# Patient Record
Sex: FEMALE | Race: WHITE | NOT HISPANIC OR LATINO | Employment: OTHER | URBAN - METROPOLITAN AREA
[De-identification: names, ages, dates, MRNs, and addresses within clinical notes are randomized per-mention and may not be internally consistent; named-entity substitution may affect disease eponyms.]

---

## 2017-01-25 ENCOUNTER — HOSPITAL ENCOUNTER (EMERGENCY)
Facility: CLINIC | Age: 82
Discharge: HOME | End: 2017-01-25
Attending: EMERGENCY MEDICINE | Admitting: EMERGENCY MEDICINE
Payer: MEDICARE

## 2017-01-25 ENCOUNTER — APPOINTMENT (OUTPATIENT)
Dept: CT IMAGING | Facility: CLINIC | Age: 82
End: 2017-01-25
Attending: EMERGENCY MEDICINE
Payer: MEDICARE

## 2017-01-25 VITALS
RESPIRATION RATE: 18 BRPM | BODY MASS INDEX: 23.18 KG/M2 | TEMPERATURE: 97.6 F | DIASTOLIC BLOOD PRESSURE: 77 MMHG | OXYGEN SATURATION: 97 % | SYSTOLIC BLOOD PRESSURE: 129 MMHG | HEART RATE: 87 BPM | WEIGHT: 135.8 LBS | HEIGHT: 64 IN

## 2017-01-25 DIAGNOSIS — R51.9 NONINTRACTABLE HEADACHE, UNSPECIFIED CHRONICITY PATTERN, UNSPECIFIED HEADACHE TYPE: ICD-10-CM

## 2017-01-25 LAB
ANION GAP SERPL CALCULATED.3IONS-SCNC: 7 MMOL/L (ref 3–14)
BASOPHILS # BLD AUTO: 0.1 10E9/L (ref 0–0.2)
BASOPHILS NFR BLD AUTO: 1.2 %
BUN SERPL-MCNC: 18 MG/DL (ref 7–30)
CALCIUM SERPL-MCNC: 9.2 MG/DL (ref 8.5–10.1)
CHLORIDE SERPL-SCNC: 109 MMOL/L (ref 94–109)
CO2 SERPL-SCNC: 27 MMOL/L (ref 20–32)
CREAT SERPL-MCNC: 0.86 MG/DL (ref 0.52–1.04)
DIFFERENTIAL METHOD BLD: NORMAL
EOSINOPHIL # BLD AUTO: 0.4 10E9/L (ref 0–0.7)
EOSINOPHIL NFR BLD AUTO: 7.4 %
ERYTHROCYTE [DISTWIDTH] IN BLOOD BY AUTOMATED COUNT: 13.5 % (ref 10–15)
ERYTHROCYTE [SEDIMENTATION RATE] IN BLOOD BY WESTERGREN METHOD: 8 MM/H (ref 0–30)
GFR SERPL CREATININE-BSD FRML MDRD: 63 ML/MIN/1.7M2
GLUCOSE SERPL-MCNC: 90 MG/DL (ref 70–99)
HCT VFR BLD AUTO: 35.6 % (ref 35–47)
HGB BLD-MCNC: 11.9 G/DL (ref 11.7–15.7)
IMM GRANULOCYTES # BLD: 0 10E9/L (ref 0–0.4)
IMM GRANULOCYTES NFR BLD: 0.3 %
INTERPRETATION ECG - MUSE: NORMAL
LYMPHOCYTES # BLD AUTO: 2.3 10E9/L (ref 0.8–5.3)
LYMPHOCYTES NFR BLD AUTO: 38.3 %
MCH RBC QN AUTO: 31.1 PG (ref 26.5–33)
MCHC RBC AUTO-ENTMCNC: 33.4 G/DL (ref 31.5–36.5)
MCV RBC AUTO: 93 FL (ref 78–100)
MONOCYTES # BLD AUTO: 0.5 10E9/L (ref 0–1.3)
MONOCYTES NFR BLD AUTO: 9.1 %
NEUTROPHILS # BLD AUTO: 2.6 10E9/L (ref 1.6–8.3)
NEUTROPHILS NFR BLD AUTO: 43.7 %
NRBC # BLD AUTO: 0 10*3/UL
NRBC BLD AUTO-RTO: 0 /100
PLATELET # BLD AUTO: 187 10E9/L (ref 150–450)
POTASSIUM SERPL-SCNC: 4.1 MMOL/L (ref 3.4–5.3)
RBC # BLD AUTO: 3.83 10E12/L (ref 3.8–5.2)
SODIUM SERPL-SCNC: 143 MMOL/L (ref 133–144)
WBC # BLD AUTO: 5.9 10E9/L (ref 4–11)

## 2017-01-25 PROCEDURE — 85025 COMPLETE CBC W/AUTO DIFF WBC: CPT | Performed by: EMERGENCY MEDICINE

## 2017-01-25 PROCEDURE — 80048 BASIC METABOLIC PNL TOTAL CA: CPT | Performed by: EMERGENCY MEDICINE

## 2017-01-25 PROCEDURE — A9270 NON-COVERED ITEM OR SERVICE: HCPCS | Mod: GY | Performed by: EMERGENCY MEDICINE

## 2017-01-25 PROCEDURE — 85652 RBC SED RATE AUTOMATED: CPT | Performed by: EMERGENCY MEDICINE

## 2017-01-25 PROCEDURE — 99285 EMERGENCY DEPT VISIT HI MDM: CPT | Mod: 25

## 2017-01-25 PROCEDURE — 25000132 ZZH RX MED GY IP 250 OP 250 PS 637: Mod: GY | Performed by: EMERGENCY MEDICINE

## 2017-01-25 PROCEDURE — 93005 ELECTROCARDIOGRAM TRACING: CPT

## 2017-01-25 PROCEDURE — 70450 CT HEAD/BRAIN W/O DYE: CPT

## 2017-01-25 RX ORDER — BUTALBITAL, ASPIRIN AND CAFFEINE 50; 325; 40 MG/1; MG/1; MG/1
2 TABLET ORAL ONCE
Status: DISCONTINUED | OUTPATIENT
Start: 2017-01-25 | End: 2017-01-25

## 2017-01-25 RX ORDER — BUTALBITAL/ASPIRIN/CAFFEINE 50-325-40
2 CAPSULE ORAL ONCE
Status: COMPLETED | OUTPATIENT
Start: 2017-01-25 | End: 2017-01-25

## 2017-01-25 RX ORDER — BUTALBITAL/ASPIRIN/CAFFEINE 50-325-40
1 CAPSULE ORAL EVERY 4 HOURS PRN
Qty: 20 CAPSULE | Refills: 0 | Status: SHIPPED | OUTPATIENT
Start: 2017-01-25 | End: 2020-12-11

## 2017-01-25 RX ADMIN — BUTALBITAL, ASPIRIN, AND CAFFEINE 2 CAPSULE: 50; 325; 40 CAPSULE ORAL at 19:36

## 2017-01-25 ASSESSMENT — ENCOUNTER SYMPTOMS
DIZZINESS: 1
NAUSEA: 0
WEAKNESS: 0
CONSTIPATION: 1
SPEECH DIFFICULTY: 0
FEVER: 0
VOMITING: 0
HEADACHES: 1

## 2017-01-25 NOTE — ED AVS SNAPSHOT
Emergency Department    [address] [place] [address]    Phone:  [phone]    Fax:  [phone]                                       Linda Kelly   MRN: [id number]    Department:   Emergency Department   Date of Visit:  1/25/2017           After Visit Summary Signature Page     I have received my discharge instructions, and my questions have been answered. I have discussed any challenges I see with this plan with the nurse or doctor.    ..........................................................................................................................................  Patient/Patient Representative Signature      ..........................................................................................................................................  Patient Representative Print Name and Relationship to Patient    ..................................................               ................................................  Date                                            Time    ..........................................................................................................................................  Reviewed by Signature/Title    ...................................................              ..............................................  Date                                                            Time

## 2017-01-25 NOTE — ED AVS SNAPSHOT
Emergency Department    [address] [facility] [address]    Phone:  [phone]    Fax:  [phone]                                       Linda Kelly   MRN: [id number]    Department:   Emergency Department   Date of Visit:  1/25/2017           Patient Information     Date Of Birth          10/8/1933        Your diagnoses for this visit were:     Nonintractable headache, unspecified chronicity pattern, unspecified headache type        You were seen by Raphael Mitchell MD.      Follow-up Information     Schedule an appointment as soon as possible for a visit with Edward Kaur MD.    Specialty:  Family Practice    Why:  recheck ed, If symptoms worsen    Contact information:    [facility] [address]   [address]  [facility] [id number] [phone]          Discharge Instructions          * HEADACHE [unspecified]    The cause of your headache today is not clear, but it does not appear to be the sign of any serious illness.  Under stress, some people tense the muscles of their shoulder, neck and scalp without knowing it. If this condition lasts long enough, a TENSION HEADACHE can occur.  A MIGRAINE HEADACHE is caused by changes in blood flow to the brain. It can be mild or severe. A migraine attack may be triggered by emotional stress, hormone changes during the menstrual cycle, oral contraceptives, alcohol use, certain foods containing tyramine, eye strain, weather changes, missing meals, lack of sleep or oversleeping.  Other causes of headache include a viral illness, sinus, ear or throat infection, dental pain and TMJ (jaw joint) pain.  HOME CARE:    If you were given pain medicine for this headache, do not drive yourself home. Arrange for a ride, instead. When you get home, try to sleep. You should feel much better when you wake up.    If you are having nausea or vomiting, follow a light diet until your headache is relieved.    If you have a migraine type headache,  use sunglasses when in the daylight or around bright indoor lighting until symptoms improve. Bright glaring light can worsen this kind of headache.  FOLLOW UP with your doctor if the headache is not better within the next 24 hours. If you have frequent headaches you should discuss a treatment plan with your primary care doctor. By being aware of the earliest signs of headache, and starting treatment right away, you may be able to stop the pain yourself.  GET PROMPT MEDICAL ATTENTION if any of the following occur:    Worsening of your head pain or no improvement within 24 hours    Repeated vomiting (unable to keep liquids down)    Fever over 101 F (38.3 C)    Stiff neck    Extreme drowsiness, confusion or fainting    Weakness of an arm or leg or one side of the face    Difficulty with speech or vision    [id number] [facility] [place], [address] [place] [address], [place] [address]. All rights reserved. This information is not intended as a substitute for professional medical care. Always follow your healthcare professional's instructions.      24 Hour Appointment Hotline       To make an appointment at any [facility], call [phone] ([phone]). If you don't have a family doctor or clinic, we will help you find one. [facility] clinics are conveniently located to serve the needs of you and your family.             Review of your medicines      START taking        Dose / Directions Last dose taken    butalbital-aspirin-caffeine -40 MG per capsule   Commonly known as:  FIORINAL   Dose:  1 capsule   Quantity:  20 capsule        Take 1 capsule by mouth every 4 hours as needed for headaches   Refills:  0          Our records show that you are taking the medicines listed below. If these are incorrect, please call your family doctor or clinic.        Dose / Directions Last dose taken    acetaminophen 325 MG tablet   Commonly known as:  TYLENOL   Dose:  975 mg   Quantity:  60 tablet        Take 3 tablets (975 mg) by  mouth every 8 hours   Refills:  0        AMOXICILLIN PO   Dose:  2000 mg        Take 2,000 mg by mouth daily as needed (Before dental appts.)   Refills:  0        calcium-vitamin D-vitamin K 500-500-40 MG-UNT-MCG Chew   Commonly known as:  VIACTIV   Dose:  1-2 tablet        Take 1-2 tablets by mouth daily   Refills:  0        DETROL PO   Dose:  2 mg        Take 2 mg by mouth daily as needed for incontinence   Refills:  0        DIOVAN PO   Dose:  80 mg        Take 80 mg by mouth daily   Refills:  0        LIPITOR 10 MG tablet   Dose:  10 mg   Generic drug:  atorvastatin        Take 10 mg by mouth every evening   Refills:  0        order for DME   Quantity:  1        colles wrist splint on right   Refills:  0        oxyCODONE 5 MG IR tablet   Commonly known as:  ROXICODONE   Dose:  5 mg   Quantity:  30 tablet        Take 1 tablet (5 mg) by mouth every 4 hours as needed for moderate to severe pain   Refills:  0        senna-docusate 8.6-50 MG per tablet   Commonly known as:  SENOKOT-S;PERICOLACE   Dose:  1-2 tablet   Quantity:  60 tablet        Take 1-2 tablets by mouth 2 times daily as needed for constipation   Refills:  0        VITAMIN B 12 PO   Dose:  100 mcg        Take 100 mcg by mouth daily   Refills:  0        VITAMIN C PO   Dose:  500 mg        Take 500 mg by mouth daily   Refills:  0        VITAMIN D (CHOLECALCIFEROL) PO   Dose:  2000 Units        Take 2,000 Units by mouth daily   Refills:  0        VITAMIN K PO   Dose:  40 mg        Take 40 mg by mouth daily   Refills:  0                Prescriptions were sent or printed at these locations (1 Prescription)                   Other Prescriptions                Printed at Department/Unit printer (1 of 1)         butalbital-aspirin-caffeine (FIORINAL) -40 MG per capsule                Procedures and tests performed during your visit     Basic metabolic panel    CBC with platelets differential    EKG 12 lead    Erythrocyte sedimentation rate auto    Head  CT w/o contrast      Orders Needing Specimen Collection     None      Pending Results     Date and Time Order Name Status Description    1/25/2017 1909 Head CT w/o contrast Preliminary     1/25/2017 1909 EKG 12 lead Preliminary             Pending Culture Results     No orders found from 1/24/2017 to 1/26/2017.       Test Results from your hospital stay           1/25/2017  8:02 PM - Interface, Flexilab Results      Component Results     Component Value Ref Range & Units Status    Sodium 143 133 - 144 mmol/L Final    Potassium 4.1 3.4 - 5.3 mmol/L Final    Chloride 109 94 - 109 mmol/L Final    Carbon Dioxide 27 20 - 32 mmol/L Final    Anion Gap 7 3 - 14 mmol/L Final    Glucose 90 70 - 99 mg/dL Final    Urea Nitrogen 18 7 - 30 mg/dL Final    Creatinine 0.86 0.52 - 1.04 mg/dL Final    GFR Estimate 63 >60 mL/min/1.7m2 Final    Non  GFR Calc    GFR Estimate If Black 76 >60 mL/min/1.7m2 Final    African American GFR Calc    Calcium 9.2 8.5 - 10.1 mg/dL Final         1/25/2017  7:47 PM - Interface, Flexilab Results      Component Results     Component Value Ref Range & Units Status    WBC 5.9 4.0 - 11.0 10e9/L Final    RBC Count 3.83 3.8 - 5.2 10e12/L Final    Hemoglobin 11.9 11.7 - 15.7 g/dL Final    Hematocrit 35.6 35.0 - 47.0 % Final    MCV 93 78 - 100 fl Final    MCH 31.1 26.5 - 33.0 pg Final    MCHC 33.4 31.5 - 36.5 g/dL Final    RDW 13.5 10.0 - 15.0 % Final    Platelet Count 187 150 - 450 10e9/L Final    Diff Method Automated Method  Final    % Neutrophils 43.7 % Final    % Lymphocytes 38.3 % Final    % Monocytes 9.1 % Final    % Eosinophils 7.4 % Final    % Basophils 1.2 % Final    % Immature Granulocytes 0.3 % Final    Nucleated RBCs 0 0 /100 Final    Absolute Neutrophil 2.6 1.6 - 8.3 10e9/L Final    Absolute Lymphocytes 2.3 0.8 - 5.3 10e9/L Final    Absolute Monocytes 0.5 0.0 - 1.3 10e9/L Final    Absolute Eosinophils 0.4 0.0 - 0.7 10e9/L Final    Absolute Basophils 0.1 0.0 - 0.2 10e9/L Final     Abs Immature Granulocytes 0.0 0 - 0.4 10e9/L Final    Absolute Nucleated RBC 0.0  Final         1/25/2017  8:05 PM - Interface, Flexilab Results      Component Results     Component Value Ref Range & Units Status    Sed Rate 8 0 - 30 mm/h Final         1/25/2017  7:49 PM - Interface, Radiant Ib      Narrative     CT OF THE HEAD WITHOUT CONTRAST 1/25/2017 7:48 PM     COMPARISON: None    HISTORY: Headache x three weeks    TECHNIQUE: 5 mm thick axial CT images of the head were acquired  without IV contrast material.    FINDINGS: There is moderate diffuse cerebral volume loss. There are  subtle patchy areas of decreased density in the cerebral white matter  bilaterally that are consistent with sequela of chronic small vessel  ischemic disease.    The ventricles and basal cisterns are within normal limits in  configuration given the degree of cerebral volume loss. There is no  midline shift. There are no extra-axial fluid collections.    No intracranial hemorrhage, mass or recent infarct.    The visualized paranasal sinuses are well-aerated. There is no  mastoiditis. There are no fractures of the visualized bones.        Impression     IMPRESSION: Diffuse cerebral volume loss and cerebral white matter  changes consistent with chronic small vessel ischemic disease. No  evidence for acute intracranial pathology.      Radiation dose for this scan was reduced using automated exposure  control, adjustment of the mA and/or kV according to patient size, or  iterative reconstruction technique                Clinical Quality Measure: Blood Pressure Screening     Your blood pressure was checked while you were in the emergency department today. The last reading we obtained was  BP: 129/77 mmHg . Please read the guidelines below about what these numbers mean and what you should do about them.  If your systolic blood pressure (the top number) is less than 120 and your diastolic blood pressure (the bottom number) is less than 80, then  "your blood pressure is normal. There is nothing more that you need to do about it.  If your systolic blood pressure (the top number) is 120-139 or your diastolic blood pressure (the bottom number) is 80-89, your blood pressure may be higher than it should be. You should have your blood pressure rechecked within a year by a primary care provider.  If your systolic blood pressure (the top number) is 140 or greater or your diastolic blood pressure (the bottom number) is 90 or greater, you may have high blood pressure. High blood pressure is treatable, but if left untreated over time it can put you at risk for heart attack, stroke, or kidney failure. You should have your blood pressure rechecked by a primary care provider within the next 4 weeks.  If your provider in the emergency department today gave you specific instructions to follow-up with your doctor or provider even sooner than that, you should follow that instruction and not wait for up to 4 weeks for your follow-up visit.        Thank you for choosing [facility]       Thank you for choosing [facility] for your care. Our goal is always to provide you with excellent care. Hearing back from our patients is one way we can continue to improve our services. Please take a few minutes to complete the written survey that you may receive in the mail after you visit with us. Thank you!        [employer] Information     [employer] lets you send messages to your doctor, view your test results, renew your prescriptions, schedule appointments and more. To sign up, go to [email or web address] . Click on \"Log in\" on the left side of the screen, which will take you to the Welcome page. Then click on \"Sign up Now\" on the right side of the page.     You will be asked to enter the access code listed below, as well as some personal information. Please follow the directions to create your username and password.     Your access code is: [email or web address]  Expires: 4/25/2017  9:09 PM     Your " access code will  in 90 days. If you need help or a new code, please call your [place] clinic or [phone].        Care EveryWhere ID     This is your Care EveryWhere ID. This could be used by other organizations to access your [facility] medical records  [id number]        After Visit Summary       This is your record. Keep this with you and show to your community pharmacist(s) and doctor(s) at your next visit.

## 2017-01-26 NOTE — DISCHARGE INSTRUCTIONS
* HEADACHE [unspecified]    The cause of your headache today is not clear, but it does not appear to be the sign of any serious illness.  Under stress, some people tense the muscles of their shoulder, neck and scalp without knowing it. If this condition lasts long enough, a TENSION HEADACHE can occur.  A MIGRAINE HEADACHE is caused by changes in blood flow to the brain. It can be mild or severe. A migraine attack may be triggered by emotional stress, hormone changes during the menstrual cycle, oral contraceptives, alcohol use, certain foods containing tyramine, eye strain, weather changes, missing meals, lack of sleep or oversleeping.  Other causes of headache include a viral illness, sinus, ear or throat infection, dental pain and TMJ (jaw joint) pain.  HOME CARE:    If you were given pain medicine for this headache, do not drive yourself home. Arrange for a ride, instead. When you get home, try to sleep. You should feel much better when you wake up.    If you are having nausea or vomiting, follow a light diet until your headache is relieved.    If you have a migraine type headache, use sunglasses when in the daylight or around bright indoor lighting until symptoms improve. Bright glaring light can worsen this kind of headache.  FOLLOW UP with your doctor if the headache is not better within the next 24 hours. If you have frequent headaches you should discuss a treatment plan with your primary care doctor. By being aware of the earliest signs of headache, and starting treatment right away, you may be able to stop the pain yourself.  GET PROMPT MEDICAL ATTENTION if any of the following occur:    Worsening of your head pain or no improvement within 24 hours    Repeated vomiting (unable to keep liquids down)    Fever over 101 F (38.3 C)    Stiff neck    Extreme drowsiness, confusion or fainting    Weakness of an arm or leg or one side of the face    Difficulty with speech or vision    [id number] [employer], [address]  [place] [address]. All rights reserved. This information is not intended as a substitute for professional medical care. Always follow your healthcare professional's instructions.

## 2017-01-26 NOTE — ED PROVIDER NOTES
"  History     Chief Complaint:  Headache    HPI   Linda Kelly is a 83 year old female who presents to the emergency department today for evaluation of a headache. The patient has had intermittent headaches for the past three weeks and today she reports that her headache was more severe and more painful. The patient reports that her pain is in the top of her head and in the back of her head. The patient has had headaches before, \"but nothing like this.\" The patient states that she is a little lightheaded and a little constipated. The patient denies any nausea, vomiting, visual problems, speech problems, fevers, or one sided weakness. The patient has taken baby aspirin and Tylenol for her pain with some relief of her symptoms. Of note, the patient lives alone but her daughter lives nearby. She also states that she is very nervous about being here. The patient's primary care provider is Dr. Edward Kaur.    Allergies:  Codeine  Talwin     Medications:    Roxicodone  Tylenol  Senna-docusate   Diovan  Detrol  Amoxicillin  Viactiv  Lipitor    Past Medical History:    Other generalized ischemic cerebrovascular disease   Chronic ischemic heart disease, unspecified   Hypertension   Other and unspecified hyperlipidemia   Osteoporosis, unspecified   Deaf nonspeaking, not elsewhere classifiable   Gastro-oesophageal reflux disease   Coronary artery disease   Other generalized ischemic cerebrovascular disease   Anxiety state, unspecified   Stented coronary artery   Generalized anxiety disorder   Iron deficiency anemia, unspecified   Arthritis   Skin cancer     Past Surgical History:    Cholecystectomy   Phacoemulsification clear cornea with standard IOL implant  rosa and vasquez oophorectomy  Cardiac stent placement  Total knee replacement bilateral  Repair bladder  Arthroplasty hip anterior  Remove hardware hip nailing  Left knee scope    Family History:    No family history on file.    Social History:  The patient was " "accompanied to the ED by her daughter.  Smoking Status: Former Smoker -- Quit Date: 2004  Marital Status:   [5]     Review of Systems   Constitutional: Negative for fever.   Eyes: Negative for visual disturbance.   Gastrointestinal: Positive for constipation. Negative for nausea and vomiting.   Neurological: Positive for dizziness and headaches. Negative for speech difficulty and weakness.   All other systems reviewed and are negative.    Physical Exam   Vitals:  Patient Vitals for the past 24 hrs:   BP Temp Temp src Pulse Resp SpO2 Height Weight   01/25/17 2100 - - - - - 97 % - -   01/25/17 2045 129/77 mmHg - - - - 97 % - -   01/25/17 1900 (!) 167/93 mmHg - - 87 18 98 % - -   01/25/17 1730 155/69 mmHg 97.6  F (36.4  C) Oral 90 18 97 % 1.626 m (5' 4\") 61.598 kg (135 lb 12.8 oz)      Physical Exam  Constitutional: Elderly white female. Supine.   HENT: No signs of trauma. No temporal artery tenderness. Pupils 2 mm.   Eyes: EOM are normal. Pupils are equal, round, and reactive to light.   Neck: Normal range of motion. No JVD present. No cervical adenopathy. No bruits.   Cardiovascular: Regular rhythm.  Exam reveals no gallop and no friction rub.    No murmur heard.  Pulmonary/Chest: Bilateral breath sounds normal. No wheezes, rhonchi or rales.  Abdominal: Soft. No tenderness. No rebound or guarding.   Musculoskeletal: No edema. No tenderness.   Lymphadenopathy: No lymphadenopathy.   Neurological: Alert and oriented to person, place, and time. Normal strength. Coordination normal. Fluent speech. No facial asymmetry. Normal sensation. Normal finger-nose-finger.  Skin: Skin is warm and dry. No rash noted. No erythema.     Emergency Department Course     ECG:  ECG taken at 1919  Normal sinus rhythm  Nonspecific ST abnormality  Rate 84 bpm. TN interval 156 ms. QRS duration 72 ms. QT/QTc 352/415 ms. P-R-T axes 94 65 53.    Imaging:  Radiology findings were communicated with the patient and her daughter who voiced " understanding of the findings.    Head CT w/o contrast  Diffuse cerebral volume loss and cerebral white matter  changes consistent with chronic small vessel ischemic disease. No  evidence for acute intracranial pathology.  Reading per radiology    Laboratory:  Laboratory findings were communicated with the patient and her daughter who voiced understanding of the findings.    CBC: WBC 5.9, HGB 11.9,    BMP: Negative  Erythrocyte Sedimentation Rate: 8    Interventions:  1936 Fiorinal 2 Capsules PO     Emergency Department Course:  Nursing notes and vitals reviewed.  I performed an exam of the patient as documented above.   IV was inserted and blood was drawn for laboratory testing, results above.  The patient was sent for a Head CT w/o contrast while in the emergency department, results above.   I discussed the treatment plan with the patient. They expressed understanding of this plan and consented to discharge. They will be discharged home with instructions for care and follow up. In addition, the patient will return to the emergency department if their symptoms persist, worsen, if new symptoms arise or if there is any concern.  All questions were answered.  I personally reviewed the lab and imaging results with the patient and answered all related questions prior to discharge.  Impression & Plan      Medical Decision Making:  Linda Kelly is a 83 year old female who presents to the emergency department today with her daughter for evaluation of headache. Her headache has been present off and on for about three weeks. It is bilateral. It is more persistent than her headaches have been in the past. She has not had many headaches. There have been no new visual problems, no eye pain, no temporal tenderness no fevers or chills or neck pain or focal numbness or weakness. She has tried very little for this so far besides some ibuprofen. On examination, there is no temporal artery tenderness or neck pain and  her neurological exam is unremarkable. Patient was worked up because of her age and the new headaches. Her CT was unremarkable. Blood, chemistry, CBC, and sedimentation rate were all normal. She received two Fiorinal and this has helped quite a bit. I think this is unlikely to be subarachnoid hemorrhage, meningitis, temporal arteritis, acute glaucoma, or thrombosis. This could be a muscle tension headache or  a migraine variant. I have recommended follow up with her PMD within the next week and I will give her Fiorinal to use at home as needed.    Diagnosis:    ICD-10-CM    1. Nonintractable headache, unspecified chronicity pattern, unspecified headache type R51      Disposition:   The patient is discharged to home.    Discharge Medications:  Discharge Medication List as of 1/25/2017  9:09 PM      START taking these medications    Details   butalbital-aspirin-caffeine (FIORINAL) -40 MG per capsule Take 1 capsule by mouth every 4 hours as needed for headaches, Disp-20 capsule, R-0, Local Print           Scribe Disclosure:  I, Anuel Mota, am serving as a scribe at 9:32 PM on 12/19/2016 to document services personally performed by No att. providers found, based on my observations and the provider's statements to me.    1/25/2017    EMERGENCY DEPARTMENT        Raphael Mitchell MD  01/26/17 0035

## 2017-01-27 ENCOUNTER — HOSPITAL ENCOUNTER (INPATIENT)
Facility: CLINIC | Age: 82
Setting detail: SURGERY ADMIT
End: 2017-01-27
Attending: ORTHOPAEDIC SURGERY | Admitting: ORTHOPAEDIC SURGERY
Payer: MEDICARE

## 2017-02-02 RX ORDER — CEFAZOLIN SODIUM 1 G/3ML
1 INJECTION, POWDER, FOR SOLUTION INTRAMUSCULAR; INTRAVENOUS SEE ADMIN INSTRUCTIONS
Status: CANCELLED | OUTPATIENT
Start: 2017-02-02

## 2017-02-02 RX ORDER — CEFAZOLIN SODIUM 2 G/100ML
2 INJECTION, SOLUTION INTRAVENOUS
Status: CANCELLED | OUTPATIENT
Start: 2017-02-02

## 2017-02-02 RX ORDER — ACETAMINOPHEN 500 MG
1000 TABLET ORAL ONCE
Status: CANCELLED | OUTPATIENT
Start: 2017-02-02 | End: 2017-02-02

## 2017-10-20 ENCOUNTER — APPOINTMENT (OUTPATIENT)
Age: 82
Setting detail: DERMATOLOGY
End: 2017-10-21

## 2017-10-20 DIAGNOSIS — L82.0 INFLAMED SEBORRHEIC KERATOSIS: ICD-10-CM

## 2017-10-20 DIAGNOSIS — D18.0 HEMANGIOMA: ICD-10-CM

## 2017-10-20 DIAGNOSIS — Z86.007 PERSONAL HISTORY OF IN-SITU NEOPLASM OF SKIN: ICD-10-CM

## 2017-10-20 DIAGNOSIS — I83.9 ASYMPTOMATIC VARICOSE VEINS OF LOWER EXTREMITIES: ICD-10-CM

## 2017-10-20 DIAGNOSIS — M20.4 OTHER HAMMER TOE(S) (ACQUIRED): ICD-10-CM

## 2017-10-20 DIAGNOSIS — L81.4 OTHER MELANIN HYPERPIGMENTATION: ICD-10-CM

## 2017-10-20 DIAGNOSIS — L57.0 ACTINIC KERATOSIS: ICD-10-CM

## 2017-10-20 DIAGNOSIS — Z85.828 PERSONAL HISTORY OF OTHER MALIGNANT NEOPLASM OF SKIN: ICD-10-CM

## 2017-10-20 DIAGNOSIS — L72.0 EPIDERMAL CYST: ICD-10-CM

## 2017-10-20 DIAGNOSIS — L82.1 OTHER SEBORRHEIC KERATOSIS: ICD-10-CM

## 2017-10-20 PROBLEM — M20.42 OTHER HAMMER TOE(S) (ACQUIRED), LEFT FOOT: Status: ACTIVE | Noted: 2017-10-20

## 2017-10-20 PROBLEM — D18.01 HEMANGIOMA OF SKIN AND SUBCUTANEOUS TISSUE: Status: ACTIVE | Noted: 2017-10-20

## 2017-10-20 PROBLEM — I83.93 ASYMPTOMATIC VARICOSE VEINS OF BILATERAL LOWER EXTREMITIES: Status: ACTIVE | Noted: 2017-10-20

## 2017-10-20 PROCEDURE — 17110 DESTRUCT B9 LESION 1-14: CPT

## 2017-10-20 PROCEDURE — OTHER MIPS QUALITY: OTHER

## 2017-10-20 PROCEDURE — OTHER COUNSELING: OTHER

## 2017-10-20 PROCEDURE — 99214 OFFICE O/P EST MOD 30 MIN: CPT | Mod: 25

## 2017-10-20 PROCEDURE — OTHER LIQUID NITROGEN: OTHER

## 2017-10-20 PROCEDURE — 17000 DESTRUCT PREMALG LESION: CPT | Mod: 59

## 2017-10-20 ASSESSMENT — LOCATION ZONE DERM
LOCATION ZONE: LEG
LOCATION ZONE: TRUNK
LOCATION ZONE: TOE
LOCATION ZONE: NOSE
LOCATION ZONE: FACE
LOCATION ZONE: ARM

## 2017-10-20 ASSESSMENT — LOCATION SIMPLE DESCRIPTION DERM
LOCATION SIMPLE: LEFT THIGH
LOCATION SIMPLE: RIGHT CHEEK
LOCATION SIMPLE: LEFT UPPER ARM
LOCATION SIMPLE: RIGHT UPPER BACK
LOCATION SIMPLE: LEFT 3RD TOE
LOCATION SIMPLE: NOSE
LOCATION SIMPLE: RIGHT PRETIBIAL REGION
LOCATION SIMPLE: LEFT BUTTOCK
LOCATION SIMPLE: LEFT UPPER BACK

## 2017-10-20 ASSESSMENT — LOCATION DETAILED DESCRIPTION DERM
LOCATION DETAILED: NASAL DORSUM
LOCATION DETAILED: LEFT DORSAL 3RD TOE
LOCATION DETAILED: RIGHT SUPERIOR UPPER BACK
LOCATION DETAILED: RIGHT INFERIOR LATERAL MALAR CHEEK
LOCATION DETAILED: LEFT SUPERIOR MEDIAL UPPER BACK
LOCATION DETAILED: RIGHT CENTRAL MALAR CHEEK
LOCATION DETAILED: LEFT BUTTOCK
LOCATION DETAILED: RIGHT PROXIMAL PRETIBIAL REGION
LOCATION DETAILED: LEFT ANTERIOR DISTAL THIGH
LOCATION DETAILED: LEFT SUPERIOR UPPER BACK
LOCATION DETAILED: LEFT ANTERIOR DISTAL UPPER ARM

## 2017-10-20 NOTE — PROCEDURE: LIQUID NITROGEN
Post-Care Instructions: I reviewed with the patient in detail post-care instructions. Patient is to wear sunprotection, and avoid picking at any of the treated lesions. Pt may apply Vaseline to crusted or scabbing areas.
Consent: The patient's consent was obtained including but not limited to risks of crusting, scabbing, blistering, scarring, darker or lighter pigmentary change, recurrence, incomplete removal and infection.
Number Of Freeze-Thaw Cycles: 1 freeze-thaw cycle
Add 52 Modifier (Optional): no
Render Post-Care Instructions In Note?: yes
Total Number Of Lesions Treated: 5
Detail Level: Detailed
Duration Of Freeze Thaw-Cycle (Seconds): 15
Medical Necessity Clause: This procedure was medically necessary because the lesions that were treated were:
Post-Care Instructions: I reviewed with the patient in detail post-care instructions. (Written instructions given) Patient is to wear sun protection, and avoid picking at any of the treated lesions. Pt may apply Vaseline to crusted or scabbing areas.
Total Number Of Aks Treated: 1
Duration Of Freeze Thaw-Cycle (Seconds): 10
Consent: The patient's verbal consent was obtained including but not limited to risks of crusting, scabbing, blistering, scarring, darker or lighter pigmentary change, recurrence, incomplete removal and infection.
Medical Necessity Information: It is in your best interest to select a reason for this procedure from the list below. All of these items fulfill various CMS LCD requirements except the new and changing color options.

## 2017-10-20 NOTE — PROCEDURE: MIPS QUALITY
Quality 130: Documentation Of Current Medications In The Medical Record: Current Medications Documented
Quality 431: Preventive Care And Screening: Unhealthy Alcohol Use - Screening: Patient screened for unhealthy alcohol use using a single question and scores less than 2 times per year
Detail Level: Detailed
Quality 226: Preventive Care And Screening: Tobacco Use: Screening And Cessation Intervention: Patient screened for tobacco and is an ex-smoker
Quality 110: Preventive Care And Screening: Influenza Immunization: Influenza Immunization previously received during influenza season

## 2018-02-06 ENCOUNTER — HOSPITAL ENCOUNTER (OUTPATIENT)
Facility: CLINIC | Age: 83
Discharge: HOME | End: 2018-02-06
Attending: OPHTHALMOLOGY | Admitting: OPHTHALMOLOGY
Payer: MEDICARE

## 2018-02-06 VITALS
OXYGEN SATURATION: 100 % | HEART RATE: 83 BPM | RESPIRATION RATE: 18 BRPM | DIASTOLIC BLOOD PRESSURE: 71 MMHG | TEMPERATURE: 96 F | SYSTOLIC BLOOD PRESSURE: 146 MMHG

## 2018-02-06 PROCEDURE — 25000125 ZZHC RX 250: Performed by: OPHTHALMOLOGY

## 2018-02-06 PROCEDURE — 66821 AFTER CATARACT LASER SURGERY: CPT | Performed by: OPHTHALMOLOGY

## 2018-02-06 RX ORDER — PHENYLEPHRINE HYDROCHLORIDE 25 MG/ML
1 SOLUTION/ DROPS OPHTHALMIC ONCE
Status: COMPLETED | OUTPATIENT
Start: 2018-02-06 | End: 2018-02-06

## 2018-02-06 RX ORDER — TROPICAMIDE 10 MG/ML
1 SOLUTION/ DROPS OPHTHALMIC ONCE
Status: COMPLETED | OUTPATIENT
Start: 2018-02-06 | End: 2018-02-06

## 2018-02-06 RX ADMIN — APRACLONIDINE HYDROCHLORIDE 1 DROP: 10 SOLUTION/ DROPS OPHTHALMIC at 11:10

## 2018-02-06 RX ADMIN — PHENYLEPHRINE HYDROCHLORIDE 1 DROP: 2.5 SOLUTION/ DROPS OPHTHALMIC at 11:10

## 2018-02-06 RX ADMIN — TROPICAMIDE 1 DROP: 10 SOLUTION/ DROPS OPHTHALMIC at 11:10

## 2018-02-06 NOTE — OP NOTE
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      Comments: Pt. to follow up at office in 48 hours.

## 2018-02-06 NOTE — BRIEF OP NOTE
[facility] Brief Operative Note    Pre-operative diagnosis: secondary membrane   Post-operative diagnosis posterior capsular opacification, right eye     Procedure: Procedure(s):  YAG LASER CAPSULOTOMY RIGHT EYE  - Wound Class: I-Clean   Surgeon(s): Surgeon(s) and Role:     * Megan Garcia MD - Primary   Estimated blood loss: * No values recorded between 2/6/2018 12:00 AM and 2/6/2018 11:25 AM *    Specimens: * No specimens in log *   Findings:

## 2019-03-15 ENCOUNTER — HOSPITAL ENCOUNTER (EMERGENCY)
Facility: CLINIC | Age: 84
Discharge: HOME | End: 2019-03-16
Attending: EMERGENCY MEDICINE | Admitting: EMERGENCY MEDICINE
Payer: MEDICARE

## 2019-03-15 DIAGNOSIS — R42 DIZZINESS: ICD-10-CM

## 2019-03-15 PROCEDURE — 80048 BASIC METABOLIC PNL TOTAL CA: CPT | Performed by: EMERGENCY MEDICINE

## 2019-03-15 PROCEDURE — 93005 ELECTROCARDIOGRAM TRACING: CPT

## 2019-03-15 PROCEDURE — 99285 EMERGENCY DEPT VISIT HI MDM: CPT | Mod: 25

## 2019-03-15 PROCEDURE — 85025 COMPLETE CBC W/AUTO DIFF WBC: CPT | Performed by: EMERGENCY MEDICINE

## 2019-03-15 RX ORDER — ONDANSETRON 2 MG/ML
4 INJECTION INTRAMUSCULAR; INTRAVENOUS EVERY 30 MIN PRN
Status: DISCONTINUED | OUTPATIENT
Start: 2019-03-15 | End: 2019-03-16 | Stop reason: HOSPADM

## 2019-03-15 RX ORDER — MECLIZINE HYDROCHLORIDE 25 MG/1
25 TABLET ORAL ONCE
Status: COMPLETED | OUTPATIENT
Start: 2019-03-15 | End: 2019-03-16

## 2019-03-15 ASSESSMENT — MIFFLIN-ST. JEOR: SCORE: 1053.7

## 2019-03-15 NOTE — ED AVS SNAPSHOT
Emergency Department  [address] [place] [id number]  Phone:  [phone]  Fax:  [phone]                                    Linda Kelly   MRN: [id number]    Department:   Emergency Department   Date of Visit:  3/15/2019           After Visit Summary Signature Page    I have received my discharge instructions, and my questions have been answered. I have discussed any challenges I see with this plan with the nurse or doctor.    ..........................................................................................................................................  Patient/Patient Representative Signature      ..........................................................................................................................................  Patient Representative Print Name and Relationship to Patient    ..................................................               ................................................  Date                                   Time    ..........................................................................................................................................  Reviewed by Signature/Title    ...................................................              ..............................................  Date                                               Time          22EPIC Rev 08/18

## 2019-03-16 ENCOUNTER — APPOINTMENT (OUTPATIENT)
Dept: MRI IMAGING | Facility: CLINIC | Age: 84
End: 2019-03-16
Attending: EMERGENCY MEDICINE
Payer: MEDICARE

## 2019-03-16 VITALS
OXYGEN SATURATION: 100 % | HEART RATE: 82 BPM | BODY MASS INDEX: 24.98 KG/M2 | DIASTOLIC BLOOD PRESSURE: 78 MMHG | HEIGHT: 63 IN | WEIGHT: 141 LBS | SYSTOLIC BLOOD PRESSURE: 117 MMHG | RESPIRATION RATE: 18 BRPM | TEMPERATURE: 98.9 F

## 2019-03-16 LAB
ANION GAP SERPL CALCULATED.3IONS-SCNC: 8 MMOL/L (ref 3–14)
BASOPHILS # BLD AUTO: 0.1 10E9/L (ref 0–0.2)
BASOPHILS NFR BLD AUTO: 1.4 %
BUN SERPL-MCNC: 28 MG/DL (ref 7–30)
CALCIUM SERPL-MCNC: 9 MG/DL (ref 8.5–10.1)
CHLORIDE SERPL-SCNC: 111 MMOL/L (ref 94–109)
CO2 SERPL-SCNC: 23 MMOL/L (ref 20–32)
CREAT SERPL-MCNC: 1.17 MG/DL (ref 0.52–1.04)
DIFFERENTIAL METHOD BLD: ABNORMAL
EOSINOPHIL # BLD AUTO: 0.5 10E9/L (ref 0–0.7)
EOSINOPHIL NFR BLD AUTO: 8.4 %
ERYTHROCYTE [DISTWIDTH] IN BLOOD BY AUTOMATED COUNT: 14.1 % (ref 10–15)
GFR SERPL CREATININE-BSD FRML MDRD: 42 ML/MIN/{1.73_M2}
GLUCOSE SERPL-MCNC: 83 MG/DL (ref 70–99)
HCT VFR BLD AUTO: 32.9 % (ref 35–47)
HGB BLD-MCNC: 10.9 G/DL (ref 11.7–15.7)
IMM GRANULOCYTES # BLD: 0 10E9/L (ref 0–0.4)
IMM GRANULOCYTES NFR BLD: 0.2 %
INTERPRETATION ECG - MUSE: NORMAL
LYMPHOCYTES # BLD AUTO: 2.4 10E9/L (ref 0.8–5.3)
LYMPHOCYTES NFR BLD AUTO: 42.1 %
MCH RBC QN AUTO: 30 PG (ref 26.5–33)
MCHC RBC AUTO-ENTMCNC: 33.1 G/DL (ref 31.5–36.5)
MCV RBC AUTO: 91 FL (ref 78–100)
MONOCYTES # BLD AUTO: 0.6 10E9/L (ref 0–1.3)
MONOCYTES NFR BLD AUTO: 10.9 %
NEUTROPHILS # BLD AUTO: 2.1 10E9/L (ref 1.6–8.3)
NEUTROPHILS NFR BLD AUTO: 37 %
NRBC # BLD AUTO: 0 10*3/UL
NRBC BLD AUTO-RTO: 0 /100
PLATELET # BLD AUTO: 199 10E9/L (ref 150–450)
POTASSIUM SERPL-SCNC: 3.9 MMOL/L (ref 3.4–5.3)
RBC # BLD AUTO: 3.63 10E12/L (ref 3.8–5.2)
SODIUM SERPL-SCNC: 142 MMOL/L (ref 133–144)
WBC # BLD AUTO: 5.6 10E9/L (ref 4–11)

## 2019-03-16 PROCEDURE — 96361 HYDRATE IV INFUSION ADD-ON: CPT

## 2019-03-16 PROCEDURE — 70553 MRI BRAIN STEM W/O & W/DYE: CPT

## 2019-03-16 PROCEDURE — A9585 GADOBUTROL INJECTION: HCPCS | Performed by: EMERGENCY MEDICINE

## 2019-03-16 PROCEDURE — A9270 NON-COVERED ITEM OR SERVICE: HCPCS | Mod: GY | Performed by: EMERGENCY MEDICINE

## 2019-03-16 PROCEDURE — 96360 HYDRATION IV INFUSION INIT: CPT | Mod: 59

## 2019-03-16 PROCEDURE — 25000128 H RX IP 250 OP 636: Performed by: EMERGENCY MEDICINE

## 2019-03-16 PROCEDURE — 25500064 ZZH RX 255 OP 636: Performed by: EMERGENCY MEDICINE

## 2019-03-16 PROCEDURE — 25000132 ZZH RX MED GY IP 250 OP 250 PS 637: Mod: GY | Performed by: EMERGENCY MEDICINE

## 2019-03-16 RX ORDER — MECLIZINE HCL 12.5 MG 12.5 MG/1
12.5-25 TABLET ORAL 4 TIMES DAILY PRN
Qty: 20 TABLET | Refills: 0 | Status: SHIPPED | OUTPATIENT
Start: 2019-03-16 | End: 2020-12-11

## 2019-03-16 RX ORDER — GADOBUTROL 604.72 MG/ML
6 INJECTION INTRAVENOUS ONCE
Status: COMPLETED | OUTPATIENT
Start: 2019-03-16 | End: 2019-03-16

## 2019-03-16 RX ADMIN — MECLIZINE HYDROCHLORIDE 25 MG: 25 TABLET ORAL at 00:01

## 2019-03-16 RX ADMIN — SODIUM CHLORIDE 1000 ML: 9 INJECTION, SOLUTION INTRAVENOUS at 00:01

## 2019-03-16 RX ADMIN — GADOBUTROL 6 ML: 604.72 INJECTION INTRAVENOUS at 02:16

## 2019-03-16 ASSESSMENT — ENCOUNTER SYMPTOMS
NECK PAIN: 1
VOMITING: 0
HEADACHES: 1
RHINORRHEA: 1
NUMBNESS: 0
ABDOMINAL PAIN: 0
APPETITE CHANGE: 0
EYE PAIN: 0
DIZZINESS: 1

## 2019-03-16 NOTE — ED NOTES
"Pt states she feels \"much better\", able to sit up without dizzyness. MD aware, at bedside, plan to roadtest pt s/p IV fluids completed. Pt and family verbalized understanding of plan, deny needs. Pt given sharad wren.   "

## 2019-03-16 NOTE — DISCHARGE INSTRUCTIONS
Make sure to stay hydrated  Meclizine as needed for dizziness  Follow up with your doctor    Discharge Instructions  Vertigo  You have been diagnosed with vertigo.  This is a dizzy feeling often described as spinning or that the room is moving around you. You will often have nausea (sick to your stomach), vomiting (throwing up), and balance problems with it.  Vertigo is usually caused by a problem in the inner ear which helps control your balance.  Many things can cause vertigo, including calcium collections in the inner ear, a virus infection of the inner ear, concussion, migraine, and some medicines.  Luckily, these causes are not life threatening and will eventually go away.  However, sometimes there is a serious problem that does not show up right away.  Generally, every Emergency Department visit should have a follow-up clinic visit with either a primary or a specialty clinic/provider. Please follow-up as instructed by your emergency provider today.  Return to the Emergency Department if you have:  New or severe headache.  Double vision (seeing two of things).  Trouble speaking or hearing.  Weakness or trouble moving/using one side of your body.  Passing out.  Numbness or tingling.  Chest pain.  Vomiting that will not stop.    Treatment:  There are several commonly prescribed medications:  Antihistamines such as meclizine (Antivert ), dimenhydrinate (Dramamine ), or diphenhydramine (Benadryl ).  Prescription anti-nausea medicines, such as promethazine (Phenergan ), metoclopramide (Reglan ), or ondansetron (Zofran ).  Prescription sedative medicines, such as diazepam (Valium ), lorazepam (Ativan ), or clonazepam (Klonopin ).  Most of these medicines make you sleepy, and you should not take them before you work or drive. You should only take prescription medicines to treat severe vertigo symptoms, and you should stop the medicine when your symptoms improve.    Follow Up:  If you have vertigo longer than three  days, it is important that you follow up either with your primary provider or an Ear, Nose, and Throat (ENT) specialist.  You may need further testing to evaluate your vertigo and you may also need ?vestibular? therapy which is a special form of physical therapy to make the vertigo go away.    If you were given a prescription for medicine here today, be sure to read all of the information (including the package insert) that comes with your prescription.  This will include important information about the medicine, its side effects, and any warnings that you need to know about.  The pharmacist who fills the prescription can provide more information and answer questions you may have about the medicine.  If you have questions or concerns that the pharmacist cannot address, please call or return to the Emergency Department.     Remember that you can always come back to the Emergency Department if you are not able to see your regular provider in the amount of time listed above, if you get any new symptoms, or if there is anything that worries you.

## 2019-03-16 NOTE — ED PROVIDER NOTES
"  History     Chief Complaint:  Dizziness    HPI   Linda Kelly is a 85 year old female who presents with dizziness. The patient states that she had 4 episodes of diarrhea this morning and has had a headache for most of the day. She notes that her headaches have been frequent recently. Tonight, she was about to go to bed and sat down on her bed and started to experience room-spinning dizziness. She called her daughter into the room and her daughter called EMS. Here in the ED, when she is laying down she is at her baseline but once she sits up she starts to experience the same dizziness.  The patient also reports a runny nose and neck pain which has been an issue for the last month. She denies any vomiting, chest pain, abdominal pain, appetite change, numbness, or ear pain.     Allergies:  Codeine  Talwin [Pentazocine]     Medications:    Tylenol  Aspirin  Lipitor  Detrol  Diovan     Past Medical History:    Anxiety  CAD  Hearing loss  HTN  Anemia  Hyperlipidemia  Skin cancer    Past Surgical History:    Hip arthroplasty  Stent placement  cholecystectomy  Michael and Madi oopherectomy  Laser YAG capsulotomy  total knee arthroplasty, bilateral  Cataract surgery  Remove hardware hip nailing    Family History:    No past pertinent family history.    Social History:  Patient presents with daughter  Patient lives alone  Former smoker  Negative for alcohol use.  Marital Status:       Review of Systems   Constitutional: Negative for appetite change.   HENT: Positive for rhinorrhea.    Eyes: Negative for pain.   Cardiovascular: Negative for chest pain.   Gastrointestinal: Negative for abdominal pain and vomiting.   Musculoskeletal: Positive for neck pain.   Neurological: Positive for dizziness and headaches. Negative for numbness.   All other systems reviewed and are negative.    Physical Exam   First Vitals:  BP: 134/71  Pulse: 82  Heart Rate: 89  Temp: 98.9  F (37.2  C)  Resp: 18  Height: 160 cm (5' 3\")  Weight: " 64 kg (141 lb)  SpO2: 100 %    Physical Exam  General: Resting comfortably on the gurney  Eyes:  The pupils are equal and round    Conjunctivae and sclerae are normal  ENT:    Moist mucous membranes, TM clear bilaterally  Neck:  Normal range of motion  CV:  Regular rate and rhythm    Skin warm and well perfused   Resp:  Lungs are clear    Non-labored    No rales    No wheezing   GI:  Abdomen is soft, there is no rigidity    No distension    No rebound tenderness     No abdominal tenderness  MS:  Normal muscular tone  Skin:  No rash or acute skin lesions noted  Neuro:   Awake, alert.      EOMI    Finger to nose intact    Speech is normal and fluent.    Face is symmetric.     Moves all extremities equally  Psych: Normal affect.  Appropriate interactions.    Emergency Department Course   ECG:  Time: 2333  Vent. Rate 90 bpm. IN interval 154. QRS duration 72. QT/QTc 356/435. P-R-T axis 63 73 51. Sinus rhythm with premature atrial complexes. Cannot rule out anterior infarct, age undetermined. Abnormal ECG. No significant change compared to EKG dated 1/25/17. Read time: 0006      Imaging:  Radiographic findings were communicated with the patient who voiced understanding of the findings.  MR Brain w and wo contrast  1. No recent infarct, intracranial mass, abnormal enhancement or evidence of intracranial hemorrhage.   2. Mild presumed chronic small vessel ischemic changes  3. Mild to moderate generalized cerebral volume loss  Per radiology    Laboratory:  CBC: WBC: 5.6, HGB: 10.9 (L), PLT: 199  BMP: Chloride: 111 (H), Creatinine: 1.17 (H), GFR: 42 (L), o/w WNL     Interventions:  0001 - NS 1L IV  0001 - Antivert 25 mg PO    Emergency Department Course:  Nursing notes and vitals reviewed.  2343: I performed an exam of the patient as documented above.     0032: I rechecked the patient. Explained findings to patient. She sat up and her dizziness did not return.    0302: I rechecked the patient. Findings and plan explained to  the Patient. Patient discharged home with instructions regarding supportive care, medications, and reasons to return. The importance of close follow-up was reviewed.       Impression & Plan    Medical Decision Making:  Linda Kelly is a 85 year old female who presents for evaluation of vertigo. The differential diagnosis of vertigo is broad and includes common etiologies such as menieres disease, labyrinthitis, benign positional vertigo, otitis media, etc.  More serious etiologies considered include central etiologies such as tumor, intracerebral bleed, dissection, ischemic cerebral vascular accident.  The patient has no significant risk factors for stroke and the history, physical exam including detailed neurologic exam, and workup in the emergency room suggests a benign cause of vertigo today. Due to persistent symptoms, an  MRI of the brain was ordered and was negative. Creatinine mildly elevated, likely from dehydration due to diarrhea. No abdominal tenderness on exam and diarrhea resolved. No chest pain or shortness of breath to suggest ACS. Patient given IV fluids and meclizine and felt much better. Patient eventually feels improved after interventions noted above and was able to ambulate without difficulty.   Further outpatient management is indicated with vertigo medications.  Vertigo precautions given for home.      Diagnosis:    ICD-10-CM    1. Dizziness R42        Disposition:  discharged to home    Discharge Medications:     Medication List      Started    meclizine 12.5 MG tablet  Commonly known as:  ANTIVERT  12.5-25 mg, Oral, 4 TIMES DAILY PRN          Toni SALINAS, am serving as a scribe on 3/15/2019 at 11:43 PM to personally document services performed by Adeola Pineda MD based on my observations and the provider's statements to me.         Toni Oseguera  3/15/2019    EMERGENCY DEPARTMENT       Adeola Pineda MD  03/16/19 0541

## 2019-03-16 NOTE — ED NOTES
Bed: ED21  Expected date: 3/15/19  Expected time: 11:20 PM  Means of arrival: Ambulance  Comments:  [address] 85F vertigo

## 2020-11-12 DIAGNOSIS — Z11.59 ENCOUNTER FOR SCREENING FOR OTHER VIRAL DISEASES: Primary | ICD-10-CM

## 2020-12-10 DIAGNOSIS — Z11.59 ENCOUNTER FOR SCREENING FOR OTHER VIRAL DISEASES: ICD-10-CM

## 2020-12-10 PROCEDURE — U0003 INFECTIOUS AGENT DETECTION BY NUCLEIC ACID (DNA OR RNA); SEVERE ACUTE RESPIRATORY SYNDROME CORONAVIRUS 2 (SARS-COV-2) (CORONAVIRUS DISEASE [COVID-19]), AMPLIFIED PROBE TECHNIQUE, MAKING USE OF HIGH THROUGHPUT TECHNOLOGIES AS DESCRIBED BY CMS-2020-01-R: HCPCS | Performed by: OPHTHALMOLOGY

## 2020-12-11 LAB
SARS-COV-2 RNA SPEC QL NAA+PROBE: NOT DETECTED
SPECIMEN SOURCE: NORMAL

## 2020-12-11 RX ORDER — IRBESARTAN 150 MG/1
150 TABLET ORAL AT BEDTIME
COMMUNITY

## 2020-12-12 ENCOUNTER — ANESTHESIA EVENT (OUTPATIENT)
Dept: SURGERY | Facility: CLINIC | Age: 85
End: 2020-12-12
Payer: MEDICARE

## 2020-12-14 ENCOUNTER — ANESTHESIA (OUTPATIENT)
Dept: SURGERY | Facility: CLINIC | Age: 85
End: 2020-12-14
Payer: MEDICARE

## 2020-12-14 ENCOUNTER — HOSPITAL ENCOUNTER (OUTPATIENT)
Facility: CLINIC | Age: 85
Discharge: HOME | End: 2020-12-14
Attending: OPHTHALMOLOGY | Admitting: OPHTHALMOLOGY
Payer: MEDICARE

## 2020-12-14 VITALS
BODY MASS INDEX: 25.68 KG/M2 | HEART RATE: 80 BPM | HEIGHT: 63 IN | SYSTOLIC BLOOD PRESSURE: 149 MMHG | RESPIRATION RATE: 16 BRPM | OXYGEN SATURATION: 94 % | WEIGHT: 144.9 LBS | TEMPERATURE: 97.2 F | DIASTOLIC BLOOD PRESSURE: 79 MMHG

## 2020-12-14 DIAGNOSIS — H02.9 LESION OF RIGHT LOWER EYELID: Primary | ICD-10-CM

## 2020-12-14 PROCEDURE — 250N000011 HC RX IP 250 OP 636: Performed by: NURSE ANESTHETIST, CERTIFIED REGISTERED

## 2020-12-14 PROCEDURE — 999N000138 HC STATISTIC PRE-PROCEDURE ASSESSMENT I: Performed by: OPHTHALMOLOGY

## 2020-12-14 PROCEDURE — 761N000001 HC RECOVERY PHASE 1 LEVEL 1 FIRST HR: Performed by: OPHTHALMOLOGY

## 2020-12-14 PROCEDURE — 360N000021 HC SURGERY LEVEL 3 EA 15 ADDTL MIN: Performed by: OPHTHALMOLOGY

## 2020-12-14 PROCEDURE — 370N000002 HC ANESTHESIA TECHNICAL FEE, EACH ADDTL 15 MIN: Performed by: OPHTHALMOLOGY

## 2020-12-14 PROCEDURE — 88331 PATH CONSLTJ SURG 1 BLK 1SPC: CPT | Mod: 26 | Performed by: PATHOLOGY

## 2020-12-14 PROCEDURE — 250N000009 HC RX 250: Performed by: OPHTHALMOLOGY

## 2020-12-14 PROCEDURE — 250N000009 HC RX 250: Performed by: NURSE ANESTHETIST, CERTIFIED REGISTERED

## 2020-12-14 PROCEDURE — 360N000020 HC SURGERY LEVEL 3 1ST 30 MIN: Performed by: OPHTHALMOLOGY

## 2020-12-14 PROCEDURE — 761N000007 HC RECOVERY PHASE 2 EACH 15 MINS: Performed by: OPHTHALMOLOGY

## 2020-12-14 PROCEDURE — 88305 TISSUE EXAM BY PATHOLOGIST: CPT | Mod: TC | Performed by: OPHTHALMOLOGY

## 2020-12-14 PROCEDURE — 258N000003 HC RX IP 258 OP 636: Performed by: NURSE ANESTHETIST, CERTIFIED REGISTERED

## 2020-12-14 PROCEDURE — 88331 PATH CONSLTJ SURG 1 BLK 1SPC: CPT | Mod: TC | Performed by: OPHTHALMOLOGY

## 2020-12-14 PROCEDURE — 272N000001 HC OR GENERAL SUPPLY STERILE: Performed by: OPHTHALMOLOGY

## 2020-12-14 PROCEDURE — 88305 TISSUE EXAM BY PATHOLOGIST: CPT | Mod: 26 | Performed by: PATHOLOGY

## 2020-12-14 PROCEDURE — 370N000001 HC ANESTHESIA TECHNICAL FEE, 1ST 30 MIN: Performed by: OPHTHALMOLOGY

## 2020-12-14 RX ORDER — LIDOCAINE HYDROCHLORIDE 20 MG/ML
INJECTION, SOLUTION INFILTRATION; PERINEURAL PRN
Status: DISCONTINUED | OUTPATIENT
Start: 2020-12-14 | End: 2020-12-14

## 2020-12-14 RX ORDER — PROPOFOL 10 MG/ML
INJECTION, EMULSION INTRAVENOUS PRN
Status: DISCONTINUED | OUTPATIENT
Start: 2020-12-14 | End: 2020-12-14

## 2020-12-14 RX ORDER — ERYTHROMYCIN 5 MG/G
OINTMENT OPHTHALMIC PRN
Status: DISCONTINUED | OUTPATIENT
Start: 2020-12-14 | End: 2020-12-14 | Stop reason: HOSPADM

## 2020-12-14 RX ORDER — BUPIVACAINE HYDROCHLORIDE AND EPINEPHRINE 5; 5 MG/ML; UG/ML
INJECTION, SOLUTION EPIDURAL; INTRACAUDAL; PERINEURAL
Status: DISCONTINUED
Start: 2020-12-14 | End: 2020-12-14 | Stop reason: HOSPADM

## 2020-12-14 RX ORDER — ERYTHROMYCIN 5 MG/G
OINTMENT OPHTHALMIC
Status: DISCONTINUED
Start: 2020-12-14 | End: 2020-12-14 | Stop reason: HOSPADM

## 2020-12-14 RX ORDER — PROPOFOL 10 MG/ML
INJECTION, EMULSION INTRAVENOUS CONTINUOUS PRN
Status: DISCONTINUED | OUTPATIENT
Start: 2020-12-14 | End: 2020-12-14

## 2020-12-14 RX ORDER — ERYTHROMYCIN 5 MG/G
OINTMENT OPHTHALMIC 3 TIMES DAILY
Qty: 2 TUBE | Refills: 1 | Status: SHIPPED | OUTPATIENT
Start: 2020-12-14

## 2020-12-14 RX ORDER — SODIUM CHLORIDE, SODIUM LACTATE, POTASSIUM CHLORIDE, CALCIUM CHLORIDE 600; 310; 30; 20 MG/100ML; MG/100ML; MG/100ML; MG/100ML
INJECTION, SOLUTION INTRAVENOUS CONTINUOUS PRN
Status: DISCONTINUED | OUTPATIENT
Start: 2020-12-14 | End: 2020-12-14

## 2020-12-14 RX ORDER — TETRACAINE HYDROCHLORIDE 5 MG/ML
SOLUTION OPHTHALMIC
Status: DISCONTINUED
Start: 2020-12-14 | End: 2020-12-14 | Stop reason: HOSPADM

## 2020-12-14 RX ORDER — TRAMADOL HYDROCHLORIDE 50 MG/1
50 TABLET ORAL EVERY 6 HOURS PRN
Qty: 6 TABLET | Refills: 0 | Status: SHIPPED | OUTPATIENT
Start: 2020-12-14 | End: 2020-12-16

## 2020-12-14 RX ADMIN — PHENYLEPHRINE HYDROCHLORIDE 50 MCG: 10 INJECTION INTRAVENOUS at 07:58

## 2020-12-14 RX ADMIN — SODIUM CHLORIDE, SODIUM LACTATE, POTASSIUM CHLORIDE, CALCIUM CHLORIDE: 600; 310; 30; 20 INJECTION, SOLUTION INTRAVENOUS at 07:32

## 2020-12-14 RX ADMIN — PROPOFOL 75 MCG/KG/MIN: 10 INJECTION, EMULSION INTRAVENOUS at 07:38

## 2020-12-14 RX ADMIN — PROPOFOL 13 MG: 10 INJECTION, EMULSION INTRAVENOUS at 07:40

## 2020-12-14 RX ADMIN — LIDOCAINE HYDROCHLORIDE 60 MG: 20 INJECTION, SOLUTION INFILTRATION; PERINEURAL at 07:38

## 2020-12-14 RX ADMIN — PHENYLEPHRINE HYDROCHLORIDE 100 MCG: 10 INJECTION INTRAVENOUS at 07:50

## 2020-12-14 RX ADMIN — PHENYLEPHRINE HYDROCHLORIDE 50 MCG: 10 INJECTION INTRAVENOUS at 07:55

## 2020-12-14 RX ADMIN — PHENYLEPHRINE HYDROCHLORIDE 100 MCG: 10 INJECTION INTRAVENOUS at 08:04

## 2020-12-14 RX ADMIN — PROPOFOL 13 MG: 10 INJECTION, EMULSION INTRAVENOUS at 07:45

## 2020-12-14 ASSESSMENT — MIFFLIN-ST. JEOR: SCORE: 1061.39

## 2020-12-14 ASSESSMENT — LIFESTYLE VARIABLES: TOBACCO_USE: 0

## 2020-12-14 NOTE — ANESTHESIA PREPROCEDURE EVALUATION
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]    Procedure(s):  RIGHT LOWER LID WEDGE EXCISION WITH FROZEN SECTION    Past Medical History:   Diagnosis Date     Anxiety state, unspecified      Arthritis     (R) hip     Chronic ischemic heart disease, unspecified      Coronary artery disease      Deaf nonspeaking, not elsewhere classifiable      Gastro-oesophageal reflux disease      Generalized anxiety disorder      Hypertension      Iron deficiency anemia, unspecified      Osteoporosis, unspecified      Other and unspecified hyperlipidemia      Other generalized ischemic cerebrovascular disease      Other generalized ischemic cerebrovascular disease      Skin cancer      Stented coronary artery      Past Surgical History:   Procedure Laterality Date     ARTHROPLASTY HIP ANTERIOR Right 2016    Procedure: ARTHROPLASTY HIP ANTERIOR;  Surgeon: Magan Santana MD;  Location:  OR     CARDIAC SURGERY      STENT PLACEMENT     CHOLECYSTECTOMY       COLONOSCOPY       GENITOURINARY SURGERY       GYN SURGERY      rosa & vasquez oopherectomy     LASER YAG CAPSULOTOMY Right 2018    Procedure: LASER YAG CAPSULOTOMY;  YAG LASER CAPSULOTOMY RIGHT EYE ;  Surgeon: Megan Garcia MD;  Location:  [facility]     ORTHOPEDIC SURGERY      (R) total knee and (L) total knee     ORTHOPEDIC SURGERY      (L) knee scope     ORTHOPEDIC SURGERY      (R) hip surgery     PHACOEMULSIFICATION CLEAR CORNEA WITH STANDARD INTRAOCULAR LENS IMPLANT  4/15/2014    Procedure: RIGHT PHACOEMULSIFICATION CLEAR CORNEA WITH STANDARD INTRAOCULAR LENS IMPLANT ;  Surgeon: Megan Garcia MD;  Location: [facility]     PHACOEMULSIFICATION CLEAR CORNEA WITH STANDARD INTRAOCULAR LENS IMPLANT  2014    Procedure: PHACOEMULSIFICATION CLEAR CORNEA WITH STANDARD INTRAOCULAR LENS IMPLANT;  Surgeon: Megan Garcia MD;  Location:   [facility]     REMOVE HARDWARE HIP NAILING Right 6/21/2016    Procedure: REMOVE HARDWARE HIP NAILING;  Surgeon: Magan Santana MD;  Location: [facility] OR     REPAIR BLADDER         Anesthesia Evaluation     . Pt has had prior anesthetic.     No history of anesthetic complications          ROS/MED HX    ENT/Pulmonary:      (-) tobacco use, asthma and sleep apnea   Neurologic:       Cardiovascular:     (+) hypertension--CAD, --stent,. : . . . :. .       METS/Exercise Tolerance:     Hematologic:         Musculoskeletal:         GI/Hepatic:     (+) GERD Asymptomatic on medication,       Renal/Genitourinary:         Endo:         Psychiatric:     (+) psychiatric history anxiety      Infectious Disease:         Malignancy:   (+) Malignancy History of Other          Other:                          Physical Exam  Normal systems: dental    Airway   Mallampati: I  TM distance: >3 FB  Neck ROM: full    Dental     Cardiovascular   Rhythm and rate: regular and normal      Pulmonary    breath sounds clear to auscultation            Lab Results   Component Value Date    WBC 5.6 03/15/2019    HGB 10.9 (L) 03/15/2019    HCT 32.9 (L) 03/15/2019     03/15/2019    SED 8 01/25/2017     03/15/2019    POTASSIUM 3.9 03/15/2019    CHLORIDE 111 (H) 03/15/2019    CO2 23 03/15/2019    BUN 28 03/15/2019    CR 1.17 (H) 03/15/2019    GLC 83 03/15/2019    JEANNE 9.0 03/15/2019    ALBUMIN 4.1 04/27/2011    PROTTOTAL 6.8 04/27/2011    ALT 10 04/27/2011    AST 27 04/27/2011    ALKPHOS 72 04/27/2011    BILITOTAL 0.2 04/27/2011    LIPASE 150 04/27/2011    INR 1.53 (H) 04/28/2011       Preop Vitals  BP Readings from Last 3 Encounters:   12/14/20 (!) 147/80   03/16/19 117/78   02/06/18 146/71    Pulse Readings from Last 3 Encounters:   12/14/20 87   03/16/19 82   02/06/18 83      Resp Readings from Last 3 Encounters:   12/14/20 20   03/16/19 18   02/06/18 18    SpO2 Readings from Last 3 Encounters:   12/14/20 96%   03/16/19 100%   02/06/18 100%      Temp  "Readings from Last 1 Encounters:   12/14/20 36.4  C (97.5  F) (Temporal)    Ht Readings from Last 1 Encounters:   12/14/20 1.6 m (5' 3\")      Wt Readings from Last 1 Encounters:   12/14/20 65.7 kg (144 lb 14.4 oz)    Estimated body mass index is 25.67 kg/m  as calculated from the following:    Height as of this encounter: 1.6 m (5' 3\").    Weight as of this encounter: 65.7 kg (144 lb 14.4 oz).       Anesthesia Plan      History & Physical Review  History and physical reviewed and following examination; no interval change.    ASA Status:  2 .        Plan for MAC with Intravenous induction.   PONV prophylaxis:  Ondansetron (or other 5HT-3) and Dexamethasone or Solumedrol         Postoperative Care  Postoperative pain management:  IV analgesics and Oral pain medications.      Consents  Anesthetic plan, risks, benefits and alternatives discussed with:  Patient..                 Gricelda Wilson  "

## 2020-12-14 NOTE — OP NOTE
"Pre operative Diagnosis:  1. Right lower eyelid margin involving lesion 9 mm x 5 mm      Post-operative Diagnosis: Same as above      Procedure(s):    1.Right wedge resection with frozen section control and with full thickness repair                 Surgeon: Julia Velez MD      Anesthesia: Monitored anesthesia care with local anesthetic      Blood loss: <5 cc      Complications: None      Specimens: 1) Medial right lower eyelid margin (frozen section returned negative for malignancy), 2) Lateral right lower eyelid margin (frozen section returned negative for malignancy) 3) Right lower eyelid wedge resection (permanent pathology)                Operative Procedure:  The risks, benefits and alternatives to the procedure were discussed with the patient.The patient agreed to the planned procedure and the patient was brought to the operating room.  A \"time out\" was performed to ensure the correct surgical site was being operated on.  The eyelid skin was cleaned with isopropyl alcohol.  A pentagonal wedge was marked with care taken to excise the entire lesion. The lesion was noted to be 9 mm x 5 mm.  Local anesthetic was injected into the operative site(s). The patient was prepped and draped in the usual sterile fashion.     Next, attention was turned to the right lower eyelid.   A wedge was marked with care taken to excise the gross appearing lesion with 1 mm margin on each side. The eye was protected with the Dez plate and the #15 blade was used to make full thickness incision through the eyelid margin along the previously placed markings.  The wedge was completed with scissors.  A medial and lateral 1 mm margin were sent for frozen section; these returned negative for malignancy.  Cautery was used for hemostasis. This wedge resection was sent for permanent pathology.  The repair was performed with partial thickness posterior lamellar passes of 5-0 vicryl sutures in an interrupted fashion. The lid margin was " repaired with 7-0 vicryl in a vertical mattress fashion: one through the gray line and one through the lash line.  Care was taken to sylvie the lid margin. Hemostasis was achieved with cautery and confirmed. The anterior lamella was then repair with interrupted 7-0 vicryl sutures.  The face was washed and antibiotic ointment was placed.     The patient was transferred to recovery in stable condition.           Julia Velez MD

## 2020-12-14 NOTE — ANESTHESIA CARE TRANSFER NOTE
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    Anesthesia Type:   MAC     Note:  Airway :Room Air  Patient transferred to:Phase II  Handoff Report: Identifed the Patient, Identified the Reponsible Provider, Reviewed the pertinent medical history, Discussed the surgical course, Reviewed Intra-OP anesthesia mangement and issues during anesthesia, Set expectations for post-procedure period and Allowed opportunity for questions and acknowledgement of understanding      Vitals: (Last set prior to Anesthesia Care Transfer)    CRNA VITALS  12/14/2020 0748 - 12/14/2020 0848      12/14/2020             Resp Rate (set):  10                Electronically Signed By: AFSANEH Edwards CRNA  December 14, 2020  12:43 PM

## 2020-12-14 NOTE — ANESTHESIA POSTPROCEDURE EVALUATION
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    Anesthesia Type:  MAC    Note:  Anesthesia Post Evaluation    Patient location during evaluation: PACU  Patient participation: Able to fully participate in evaluation  Level of consciousness: awake  Airway patency: patent  Cardiovascular status: acceptable  Respiratory status: acceptable  Hydration status: acceptable     Anesthetic complications: None          Last vitals:  Vitals:    12/14/20 0845 12/14/20 0900 12/14/20 0945   BP: 128/63 (!) 141/68 (!) 149/79   Pulse: 74 80    Resp: 13 13 16   Temp: 36.2  C (97.2  F)     SpO2: 95% 91% 94%         Electronically Signed By: Gricelda Wilson  December 14, 2020  1:31 PM

## 2020-12-14 NOTE — DISCHARGE INSTRUCTIONS
Same Day Surgery Discharge Instructions for  Sedation and General Anesthesia       It's not unusual to feel dizzy, light-headed or faint for up to 24 hours after surgery or while taking pain medication.  If you have these symptoms: sit for a few minutes before standing and have someone assist you when you get up to walk or use the bathroom.      You should rest and relax for the next 24 hours. We recommend you make arrangements to have an adult stay with you for at least 24 hours after your discharge.  Avoid hazardous and strenuous activity.      DO NOT DRIVE any vehicle or operate mechanical equipment for 24 hours following the end of your surgery.  Even though you may feel normal, your reactions may be affected by the medication you have received.      Do not drink alcoholic beverages for 24 hours following surgery.       Slowly progress to your regular diet as you feel able. It's not unusual to feel nauseated and/or vomit after receiving anesthesia.  If you develop these symptoms, drink clear liquids (apple juice, ginger ale, broth, 7-up, etc. ) until you feel better.  If your nausea and vomiting persists for 24 hours, please notify your surgeon.        All narcotic pain medications, along with inactivity and anesthesia, can cause constipation. Drinking plenty of liquids and increasing fiber intake will help.      For any questions of a medical nature, call your surgeon.      Do not make important decisions for 24 hours.      If you had general anesthesia, you may have a sore throat for a couple of days related to the breathing tube used during surgery.  You may use Cepacol lozenges to help with this discomfort.  If it worsens or if you develop a fever, contact your surgeon.       If you feel your pain is not well managed with the pain medications prescribed by your surgeon, please contact your surgeon's office to let them know so they can address your concerns.       CoVid 19 Information    We want to give you  information regarding Covid. Please consult your primary care provider with any questions you might have.     Patient who have symptoms (cough, fever, or shortness of breath), need to isolate for 7 days from when symptoms started OR 72 hours after fever resolves (without fever reducing medications) AND improvement of respiratory symptoms (whichever is longer).      Isolate yourself at home (in own room/own bathroom if possible)    Do Not allow any visitors    Do Not go to work or school    Do Not go to [employer],  centers, shopping, or other public places.    Do Not shake hands.    Avoid close and intimate contact with others (hugging, kissing).    Follow CDC recommendations for household cleaning of frequently touched services.     After the initial 7 days, continue to isolate yourself from household members as much as possible. To continue decrease the risk of community spread and exposure, you and any members of your household should limit activities in public for 14 days after starting home isolation.     You can reference the following CDC link for helpful home isolation/care tips:  https://www.cdc.gov/coronavirus/2019-ncov/downloads/10Things.pdf    Protect Others:    Cover Your Mouth and Nose with a mask, disposable tissue or wash cloth to avoid spreading germs to others.    Wash your hands and face frequently with soap and water    Call Your Primary Doctor If: Breathing difficulty develops or you become worse.    For more information about COVID19 and options for caring for yourself at home, please visit the CDC website at https://www.cdc.gov/coronavirus/2019-ncov/about/steps-when-sick.html  For more options for care at [facility], please visit our website at [email or web address]        [facility]   Eyelid/Orbital Surgery Discharge Instructions  Dr. Julia Velez     ICE COMPRESSES  Immediately following surgery, you should begin to apply ice  compresses.  Apply a cold gel pack or wrap a clean washcloth around a cup of crushed ice in a plastic bag (a bag of frozen peas also works well) and hold the cold compresses directly against the closed eyelid (s).Apply cold pack for a minimum of six times daily for no longer than 15 minutes at a time. Continue cold compresses every day until the bruising and swelling begin to subside.  This can vary for each patient, but three days may be common.    HOT COMPRESSES  After your swelling and bruising have begun to subside, hot compresses should be applied.  Take a clean washcloth and wring it out in hot water (as warm as you can tolerate comfortably).  Hold this warm compress against the closed eyelid(s) at least six times per day for 15 minutes.  This should be continued for about two weeks.    OINTMENT  You may be given some ointment when you leave the hospital.  Apply this ointment as directed per pharmacy label for 7 days.  Expect some blurring of vision from the ointment.     ACTIVITY  Avoid heavy lifting or vigorous exercise for one week after surgery.  You may resume regular activities as tolerated.  You may shower and wash your hair on the day after surgery; be careful to avoid soaking the wounds or getting shampoo in your eyes. While your eyes are still swollen, it is recommended you sleep on your back and elevate your head with 2-3 pillows.    MEDICATION  If the doctor has given you some medications to take after surgery, please take these according to the instructions on the bottle.  Pain medications may make you drowsy so do not drive, operate heavy machinery, or use alcohol while taking it.  When you feel that you do not need the prescription pain medication, you may substitute Extra Strength Tylenol for mild pain by also following the directions on the bottle.    If you were taking Aspirin prior to your surgery, you may resume this medication tomorrow.    If you were on an anticoagulant, you may resume  taking it with the next scheduled dose.      WHAT TO EXPECT  You should expect some slight oozing of blood from the incision site over the first two to three days after surgery.  Swelling and bruising will occur for one to two weeks or longer.  You may also experience itching and tearing during the first several weeks after surgery.  This is part of the normal healing process    QUESTIONS  Please feel free to contact the office, should you have any questions that are not answered above.  The phone number is [phone].  Please call immediately if you are unable to establish vision in the operative eye, you are experiencing heavy bleeding that will not stop with gentle pressure or you have any signs of an infection (greenish/yellow discharge or progressive redness).    [facility]  [address]  [place], [place] [address]  [phone]

## 2020-12-16 LAB — COPATH REPORT: NORMAL

## 2021-02-25 ENCOUNTER — IMMUNIZATION (OUTPATIENT)
Dept: NURSING | Facility: CLINIC | Age: 86
End: 2021-02-25
Payer: MEDICARE

## 2021-02-25 PROCEDURE — 0001A PR COVID VAC PFIZER DIL RECON 30 MCG/0.3 ML IM: CPT

## 2021-02-25 PROCEDURE — 91300 PR COVID VAC PFIZER DIL RECON 30 MCG/0.3 ML IM: CPT

## 2021-03-18 ENCOUNTER — IMMUNIZATION (OUTPATIENT)
Dept: NURSING | Facility: CLINIC | Age: 86
End: 2021-03-18
Attending: INTERNAL MEDICINE
Payer: MEDICARE

## 2021-03-18 PROCEDURE — 91300 PR COVID VAC PFIZER DIL RECON 30 MCG/0.3 ML IM: CPT

## 2021-03-18 PROCEDURE — 0002A PR COVID VAC PFIZER DIL RECON 30 MCG/0.3 ML IM: CPT

## 2023-03-16 ENCOUNTER — HOSPITAL ENCOUNTER (EMERGENCY)
Facility: CLINIC | Age: 88
Discharge: HOME | End: 2023-03-16
Attending: EMERGENCY MEDICINE | Admitting: EMERGENCY MEDICINE
Payer: MEDICARE

## 2023-03-16 VITALS
TEMPERATURE: 98.5 F | OXYGEN SATURATION: 95 % | RESPIRATION RATE: 16 BRPM | SYSTOLIC BLOOD PRESSURE: 163 MMHG | DIASTOLIC BLOOD PRESSURE: 89 MMHG | HEART RATE: 94 BPM

## 2023-03-16 DIAGNOSIS — E86.0 DEHYDRATION: ICD-10-CM

## 2023-03-16 DIAGNOSIS — R42 POSTURAL LIGHTHEADEDNESS: ICD-10-CM

## 2023-03-16 LAB
ALBUMIN UR-MCNC: NEGATIVE MG/DL
ANION GAP SERPL CALCULATED.3IONS-SCNC: 10 MMOL/L (ref 7–15)
APPEARANCE UR: CLEAR
ATRIAL RATE - MUSE: 92 BPM
BACTERIA #/AREA URNS HPF: ABNORMAL /HPF
BASOPHILS # BLD AUTO: 0.1 10E3/UL (ref 0–0.2)
BASOPHILS NFR BLD AUTO: 1 %
BILIRUB UR QL STRIP: NEGATIVE
BUN SERPL-MCNC: 24.6 MG/DL (ref 8–23)
CALCIUM SERPL-MCNC: 9.9 MG/DL (ref 8.8–10.2)
CHLORIDE SERPL-SCNC: 105 MMOL/L (ref 98–107)
COLOR UR AUTO: ABNORMAL
CREAT SERPL-MCNC: 1.68 MG/DL (ref 0.51–0.95)
DEPRECATED HCO3 PLAS-SCNC: 27 MMOL/L (ref 22–29)
DIASTOLIC BLOOD PRESSURE - MUSE: NORMAL MMHG
EOSINOPHIL # BLD AUTO: 0.2 10E3/UL (ref 0–0.7)
EOSINOPHIL NFR BLD AUTO: 3 %
ERYTHROCYTE [DISTWIDTH] IN BLOOD BY AUTOMATED COUNT: 13.4 % (ref 10–15)
GFR SERPL CREATININE-BSD FRML MDRD: 29 ML/MIN/1.73M2
GLUCOSE SERPL-MCNC: 101 MG/DL (ref 70–99)
GLUCOSE UR STRIP-MCNC: NEGATIVE MG/DL
HCT VFR BLD AUTO: 34.5 % (ref 35–47)
HGB BLD-MCNC: 11.1 G/DL (ref 11.7–15.7)
HGB UR QL STRIP: NEGATIVE
IMM GRANULOCYTES # BLD: 0 10E3/UL
IMM GRANULOCYTES NFR BLD: 0 %
INTERPRETATION ECG - MUSE: NORMAL
KETONES UR STRIP-MCNC: NEGATIVE MG/DL
LEUKOCYTE ESTERASE UR QL STRIP: ABNORMAL
LYMPHOCYTES # BLD AUTO: 2.1 10E3/UL (ref 0.8–5.3)
LYMPHOCYTES NFR BLD AUTO: 30 %
MCH RBC QN AUTO: 29.6 PG (ref 26.5–33)
MCHC RBC AUTO-ENTMCNC: 32.2 G/DL (ref 31.5–36.5)
MCV RBC AUTO: 92 FL (ref 78–100)
MONOCYTES # BLD AUTO: 0.6 10E3/UL (ref 0–1.3)
MONOCYTES NFR BLD AUTO: 8 %
MUCOUS THREADS #/AREA URNS LPF: PRESENT /LPF
NEUTROPHILS # BLD AUTO: 3.9 10E3/UL (ref 1.6–8.3)
NEUTROPHILS NFR BLD AUTO: 58 %
NITRATE UR QL: POSITIVE
NRBC # BLD AUTO: 0 10E3/UL
NRBC BLD AUTO-RTO: 0 /100
P AXIS - MUSE: 61 DEGREES
PH UR STRIP: 5.5 [PH] (ref 5–7)
PLATELET # BLD AUTO: 234 10E3/UL (ref 150–450)
POTASSIUM SERPL-SCNC: 4.4 MMOL/L (ref 3.4–5.3)
PR INTERVAL - MUSE: 160 MS
QRS DURATION - MUSE: 68 MS
QT - MUSE: 346 MS
QTC - MUSE: 427 MS
R AXIS - MUSE: 42 DEGREES
RBC # BLD AUTO: 3.75 10E6/UL (ref 3.8–5.2)
RBC URINE: 0 /HPF
SODIUM SERPL-SCNC: 142 MMOL/L (ref 136–145)
SP GR UR STRIP: 1.01 (ref 1–1.03)
SQUAMOUS EPITHELIAL: <1 /HPF
SYSTOLIC BLOOD PRESSURE - MUSE: NORMAL MMHG
T AXIS - MUSE: 36 DEGREES
UROBILINOGEN UR STRIP-MCNC: NORMAL MG/DL
VENTRICULAR RATE- MUSE: 92 BPM
WBC # BLD AUTO: 6.8 10E3/UL (ref 4–11)
WBC URINE: 11 /HPF

## 2023-03-16 PROCEDURE — 81001 URINALYSIS AUTO W/SCOPE: CPT | Performed by: EMERGENCY MEDICINE

## 2023-03-16 PROCEDURE — 93005 ELECTROCARDIOGRAM TRACING: CPT

## 2023-03-16 PROCEDURE — 96361 HYDRATE IV INFUSION ADD-ON: CPT

## 2023-03-16 PROCEDURE — 80048 BASIC METABOLIC PNL TOTAL CA: CPT | Performed by: EMERGENCY MEDICINE

## 2023-03-16 PROCEDURE — 258N000003 HC RX IP 258 OP 636: Performed by: EMERGENCY MEDICINE

## 2023-03-16 PROCEDURE — 87086 URINE CULTURE/COLONY COUNT: CPT | Performed by: EMERGENCY MEDICINE

## 2023-03-16 PROCEDURE — 85025 COMPLETE CBC W/AUTO DIFF WBC: CPT | Performed by: EMERGENCY MEDICINE

## 2023-03-16 PROCEDURE — 96360 HYDRATION IV INFUSION INIT: CPT

## 2023-03-16 PROCEDURE — 99284 EMERGENCY DEPT VISIT MOD MDM: CPT | Mod: 25

## 2023-03-16 PROCEDURE — 36415 COLL VENOUS BLD VENIPUNCTURE: CPT | Performed by: EMERGENCY MEDICINE

## 2023-03-16 RX ADMIN — SODIUM CHLORIDE 1000 ML: 9 INJECTION, SOLUTION INTRAVENOUS at 14:24

## 2023-03-16 ASSESSMENT — ACTIVITIES OF DAILY LIVING (ADL)
ADLS_ACUITY_SCORE: 35
ADLS_ACUITY_SCORE: 33

## 2023-03-16 ASSESSMENT — ENCOUNTER SYMPTOMS
DIZZINESS: 1
APPETITE CHANGE: 1
HEADACHES: 1
LIGHT-HEADEDNESS: 1
NERVOUS/ANXIOUS: 1

## 2023-03-16 NOTE — ED NOTES
Rapid Assessment Note    History:   Linda Kelly is a 89 year old female who presents with dizziness. The patient reports a week of light-headedness. She notes getting dizzy whenever she stands up from sitting or lying down, and this resolves with sitting/lying back down. She notes developing an intermittent headache (not severe) in the back left side of her head today, which in conjunction with her other symptoms, prompted her visit to the ED. As she presents to the ED, she is not currently experiencing any headache. Her main concern is the dizziness. She admits to not drinking much fluids, and her daughter believes she may be dehydrated. She denies any nausea, vomiting, diarrhea, numbness, weakness, vision changes, or speech changes.    Exam:   General:  Alert, interactive  Cardiovascular:  Well perfused  Lungs:  No respiratory distress, no accessory muscle use  Neuro:  Moving all 4 extremities  Skin:  Warm, dry  Psych:  Normal affect  ***    Plan of Care:   I evaluated the patient and developed an initial plan of care. I discussed this plan and explained that I, or one of my partners, would be returning to complete the evaluation.         I, Luis Fernando Rowland, am serving as a scribe to document services personally performed by Caryn Joe MD, based on my observations and the provider's statements to me.    3/16/2023  [employer]    Portions of this medical record were completed by a scribe. UPON MY REVIEW AND AUTHENTICATION BY ELECTRONIC SIGNATURE, this confirms (a) I performed the applicable clinical services, and (b) the record is accurate.

## 2023-03-16 NOTE — ED PROVIDER NOTES
History     Chief Complaint:  Headache and Dizziness    The history is provided by the patient.      Linda Kelly is a 89 year old female with a history of hypertension, hyperlipidemia, CAD, chronic ischemic heart disease, and anxiety who presents with headache and dizziness. The patient reports experiencing a dizzy spell this morning that prompted her to call her daughter and request that an ambulance be called to her house. States that she had dizzy and lightheaded spells for the past couple of days, but none as severe as today's. Notes that these spells happen mostly in the morning and that she does not drink very much water. Adds that the spells last for a couple of minutes and then resolve completely. Reports she has also had decreased appetite, headache, and sharp left sided chest pain a couple of times. States that she has taken Tylenol and baby aspirin for her headache. Per the patient's daughter, the patient was very worried about her dizzy spell today and wanted to call EMS. Upon arrival, EMS reported a normal workup. Adds that the patient as talked with her primary provider about anxiety, but the patient is apprehensive about taking anxiety medications. Denies abdominal pain and current headache.     Independent Historian:   Daughter - They report the patient has had dizzy spells for the past couple of days which have caused her some anxiety.    Review of External Notes: I looked in Care Everywhere and reviewed the patent's kidney function over the past 3 years which showed a slow rise in creatinine.    ROS:  Review of Systems   Constitutional: Positive for appetite change.   Cardiovascular: Positive for chest pain.   Neurological: Positive for dizziness, light-headedness and headaches.   Psychiatric/Behavioral: The patient is nervous/anxious.    All other systems reviewed and are negative.    Allergies:  Codeine  Pentazocine  Talwin [Pentazocine]     Medications:     Erythromycin  Amoxicillin  Aspirin 81 MG  Atorvastatin  Irbesartan  Tolterodine tartrate    Past Medical History:    Anxiety  Arthritis  Chronic ischemic heart disease  Coronary artery disease  Deaf, non speaking  GERD  Hypertension  Iron deficiency anemia  Osteoporosis  Hyperlipidemia  Skin cancer  Intestinal malabsorption   Urinary incontinence  Skin cancer  Leg edema  Acute blood loss anemia  Hypertonicity of bladder    Past Surgical History:    Arthroplasty hip, right  Cardiac stent placement  Cholecystectomy  FE and BSO  Laser yag capsulotomy, right  Total knee arthroplasty, bilateral  Cataract removal  Remove hardware hip, right  Repair bladder  Wedge resection eyelid, right   Skin surgery     Family History:    Father- myocardial infarction  Daughter- basal skin cancer    Social History:  Presents with daughterKesha  Presents via private vehicle     Physical Exam     Patient Vitals for the past 24 hrs:   BP Temp Temp src Pulse Resp SpO2   03/16/23 1830 (!) 157/78 -- -- 91 -- 99 %   03/16/23 1800 (!) 143/98 -- -- 89 -- 97 %   03/16/23 1730 (!) 163/72 -- -- 77 -- 98 %   03/16/23 1705 (!) 160/116 -- -- 85 16 98 %   03/16/23 1426 128/65 -- -- -- -- --   03/16/23 1414 -- 98.5  F (36.9  C) Temporal 97 16 96 %      Physical Exam  Eye:  Pupils are equal, round, and reactive.  Extraocular movements intact.    ENT:  No rhinorrhea.  Moist mucus membranes.  Normal tongue and tonsil.    Cardiac:  Regular rate and rhythm.  No murmurs, gallops, or rubs.    Pulmonary:  Clear to auscultation bilaterally.  No wheezes, rales, or rhonchi.    Abdomen:  Positive bowel sounds.  Abdomen is soft and non-distended, without focal tenderness.    Musculoskeletal:  Normal movement of all extremities without evidence for deficit.    Skin:  Warm and dry without rashes.    Neurologic:  Non-focal exam without asymmetric weakness or numbness.     Psychiatric:  Normal affect with appropriate interaction with examiner.    Emergency  Department Course   ECG  ECG taken at 1407, ECG read at 1841  Sinus rhythm with marked sinus arrhythmia  Low voltage QRS   Rate 92 bpm. WI interval 160 ms. QRS duration 68 ms. QT/QTc 346/427 ms. P-R-T axes 61 42 36.     Laboratory:  Labs Ordered and Resulted from Time of ED Arrival to Time of ED Departure   BASIC METABOLIC PANEL - Abnormal       Result Value    Sodium 142      Potassium 4.4      Chloride 105      Carbon Dioxide (CO2) 27      Anion Gap 10      Urea Nitrogen 24.6 (*)     Creatinine 1.68 (*)     Calcium 9.9      Glucose 101 (*)     GFR Estimate 29 (*)    CBC WITH PLATELETS AND DIFFERENTIAL - Abnormal    WBC Count 6.8      RBC Count 3.75 (*)     Hemoglobin 11.1 (*)     Hematocrit 34.5 (*)     MCV 92      MCH 29.6      MCHC 32.2      RDW 13.4      Platelet Count 234      % Neutrophils 58      % Lymphocytes 30      % Monocytes 8      % Eosinophils 3      % Basophils 1      % Immature Granulocytes 0      NRBCs per 100 WBC 0      Absolute Neutrophils 3.9      Absolute Lymphocytes 2.1      Absolute Monocytes 0.6      Absolute Eosinophils 0.2      Absolute Basophils 0.1      Absolute Immature Granulocytes 0.0      Absolute NRBCs 0.0     ROUTINE UA WITH MICROSCOPIC REFLEX TO CULTURE - Abnormal    Color Urine Straw      Appearance Urine Clear      Glucose Urine Negative      Bilirubin Urine Negative      Ketones Urine Negative      Specific Gravity Urine 1.006      Blood Urine Negative      pH Urine 5.5      Protein Albumin Urine Negative      Urobilinogen Urine Normal      Nitrite Urine Positive (*)     Leukocyte Esterase Urine Trace (*)     Bacteria Urine Few (*)     Mucus Urine Present (*)     RBC Urine 0      WBC Urine 11 (*)     Squamous Epithelials Urine <1     URINE CULTURE     Emergency Department Course & Assessments:     Interventions:  Medications   0.9% sodium chloride BOLUS (0 mLs Intravenous Stopped 3/16/23 1704)      Assessments:  1849 I obtained history and examined the history as noted above.    1946 I rechecked and updated the patient.     Independent Interpretation (X-rays, CTs, rhythm strip):  None    Consultations/Discussion of Management or Tests:  None     Social Determinants of Health affecting care:   None    Disposition:  The patient was discharged to home.     Impression & Plan    Medical Decision Making:  This delightful 89-year-old woman presents to us because of postural lightheadedness.  She notes that this has been a problem in the past.  She became very anxious when she felt lightheaded getting out of bed on Monday morning.  However, symptoms resolved and she seemed to feel well throughout the rest of the day.  She again felt lightheaded when she got out of bed this morning.  She called her daughter, requesting to come to the hospital.  EMS was called and they assessed her on scene.  They found her to have normal vital signs and resolution of all symptoms, advising the daughter to bring her in.  Unfortunately, there was a prolonged wait to be seen, and while she was in the waiting room, she received a liter of fluids and laboratory investigation.    At the time of my assessment, she is feeling much improved.  I personally observed her getting up and walking to and from the bathroom where she had no further lightheadedness.  Her orthostatic vital signs are normal.  Her urinalysis is clear.  She freely admits that she does not drink enough water.  She has baseline renal insufficiency, and this does appear to be slightly worse, though I assume it would be improved after receiving fluids and I do not feel this needs to be rechecked.  We discussed the importance of drinking fluids.  Her symptoms always seem to occur first thing in the morning and I explained this is often when people are the most dehydrated.  I reassured her that this is not something to become anxious about, but rather to move a little more slowly when she changes positions and to drink ample fluids, especially first thing in  the morning.  Otherwise, she will follow-up with her primary team.  I see no indication for admission at this time.  She will otherwise return to us for worsening of condition or other emergent concerns.      Diagnosis:    ICD-10-CM    1. Postural lightheadedness  R42       2. Dehydration  E86.0           Scribe Disclosure:  I, Bina Shuaslula, am serving as a scribe at 6:05 PM on 3/16/2023 to document services personally performed by Trierweiler, Chad A, MD based on my observations and the provider's statements to me.     3/16/2023   Trierweiler, Chad A, MD Trierweiler, Chad A, MD  03/17/23 0135

## 2023-03-16 NOTE — ED TRIAGE NOTES
Patient states she has had headache on and off today. Dizziness when standing for the past week. States she doesn't drink enough water     Triage Assessment     Row Name 03/16/23 1417       Respiratory WDL    Respiratory WDL WDL       Cardiac WDL    Cardiac WDL X  dizziness       Cognitive/Neuro/Behavioral WDL    Cognitive/Neuro/Behavioral WDL WDL

## 2023-03-17 ENCOUNTER — TELEPHONE (OUTPATIENT)
Dept: EMERGENCY MEDICINE | Facility: CLINIC | Age: 88
End: 2023-03-17
Payer: MEDICARE

## 2023-03-17 NOTE — TELEPHONE ENCOUNTER
[facility] Emergency Department/Urgent Care Lab result notification    [place] ED lab result protocol used  Urine Culture    Reason for call  Notify of lab results, assess symptoms, review ED providers recommendations/discharge instructions (if necessary) and advise per ED lab result f/u protocol    Lab Result (including Rx patient on, if applicable)  Preliminary urine culture report on 3/17/23 shows the presence of bacteria(s):  >100,000 CFU/mL Gram negative bacilli   Emergency Dept/Urgent Care discharge antibiotic: None  Recommendations per [facility] ED Lab result Urine culture protocol.    Information table from Emergency Dept/Urgent Care Provider visit on 3/16/23  Symptoms reported at ED visit (Chief complaint, HPI) Headache and Dizziness     The history is provided by the patient.      Linda Kelly is a 89 year old female with a history of hypertension, hyperlipidemia, CAD, chronic ischemic heart disease, and anxiety who presents with headache and dizziness. The patient reports experiencing a dizzy spell this morning that prompted her to call her daughter and request that an ambulance be called to her house. States that she had dizzy and lightheaded spells for the past couple of days, but none as severe as today's. Notes that these spells happen mostly in the morning and that she does not drink very much water. Adds that the spells last for a couple of minutes and then resolve completely. Reports she has also had decreased appetite, headache, and sharp left sided chest pain a couple of times. States that she has taken Tylenol and baby aspirin for her headache. Per the patient's daughter, the patient was very worried about her dizzy spell today and wanted to call EMS. Upon arrival, EMS reported a normal workup. Adds that the patient as talked with her primary provider about anxiety, but the patient is apprehensive about taking anxiety medications. Denies abdominal pain and  current headache.    Significant Medical hx, if applicable (i.e. CKD, diabetes) Reviewed   Allergies Allergies   Allergen Reactions     Codeine Rash     Pentazocine Other (See Comments) and Rash     TACHYCARDIA     Talwin [Pentazocine] Rash      Weight, if applicable Wt Readings from Last 2 Encounters:   12/14/20 65.7 kg (144 lb 14.4 oz)   03/15/19 64 kg (141 lb)      Coumadin/Warfarin [Yes /No] No   Creatinine Level (mg/dl) Creatinine   Date Value Ref Range Status   03/16/2023 1.68 (H) 0.51 - 0.95 mg/dL Final   03/15/2019 1.17 (H) 0.52 - 1.04 mg/dL Final      Creatinine clearance (ml/min), if applicable Creatinine clearance cannot be calculated (Unknown ideal weight.)  Last weight check 6/14/22 131 lbs   CrCl 21.113   Pregnant (Yes/No/NA) NA   Breastfeeding (Yes/No/NA) NA   ED providers Impression and Plan (applicable information) This delightful 89-year-old woman presents to us because of postural lightheadedness.  She notes that this has been a problem in the past.  She became very anxious when she felt lightheaded getting out of bed on Monday morning.  However, symptoms resolved and she seemed to feel well throughout the rest of the day.  She again felt lightheaded when she got out of bed this morning.  She called her daughter, requesting to come to the hospital.  EMS was called and they assessed her on scene.  They found her to have normal vital signs and resolution of all symptoms, advising the daughter to bring her in.  Unfortunately, there was a prolonged wait to be seen, and while she was in the waiting room, she received a liter of fluids and laboratory investigation.     At the time of my assessment, she is feeling much improved.  I personally observed her getting up and walking to and from the bathroom where she had no further lightheadedness.  Her orthostatic vital signs are normal.  Her urinalysis is clear.  She freely admits that she does not drink enough water.  She has baseline renal insufficiency,  and this does appear to be slightly worse, though I assume it would be improved after receiving fluids and I do not feel this needs to be rechecked.  We discussed the importance of drinking fluids.  Her symptoms always seem to occur first thing in the morning and I explained this is often when people are the most dehydrated.  I reassured her that this is not something to become anxious about, but rather to move a little more slowly when she changes positions and to drink ample fluids, especially first thing in the morning.  Otherwise, she will follow-up with her primary team.  I see no indication for admission at this time.  She will otherwise return to us for worsening of condition or other emergent concerns.      ED diagnosis  Postural lightheadedness     Dehydration     ED provider Trierweiler, Chad A, MD        RN Assessment (Patient s current Symptoms), include time called.  [Insert Left message here if message left]  2:15p  Per daughter Kesha who was with her mom at ER visit.  Mom is not having any UTI symptoms at this time and is comfortable waiting for final report to discuss treatment.  Will call upon final culture.      Please Contact your PCP clinic or return to the Emergency department if your:    Symptoms return.    Symptoms do not improve after 3 days on antibiotic.    Symptoms do not resolve after completing antibiotic.    Symptoms worsen or other concerning symptom's.        Amie Madrigal RN  [facility]  Emergency Dept Lab Result RN  Ph# [phone]     Copy of Lab result   Urine Culture  Order: [id number] - Reflex for Order [id number]   Status: Preliminary result      Visible to patient: No (not released)     Specimen Information: Urine, Clean Catch    1 Result Note  Culture >100,000 CFU/mL Gram negative bacilli Abnormal             Resulting Agency: [facility]           Specimen Collected: 03/16/23  7:20 PM Last Resulted: 03/17/23  1:34 PM

## 2023-03-18 LAB — BACTERIA UR CULT: ABNORMAL

## 2023-03-18 RX ORDER — CEFPODOXIME PROXETIL 200 MG/1
200 TABLET, FILM COATED ORAL EVERY 24 HOURS
Qty: 5 TABLET | Refills: 0 | Status: CANCELLED | OUTPATIENT
Start: 2023-03-18 | End: 2023-03-23

## 2023-03-18 NOTE — TELEPHONE ENCOUNTER
[facility] Emergency Department/Urgent Care Lab result notification     [place] ED lab result protocol used  Urine Culture     Reason for call  Notify of lab results, assess symptoms, review ED providers recommendations/discharge instructions (if necessary) and advise per ED lab result f/u protocol     Lab Result (including Rx patient on, if applicable)  Final urine culture on 3/18/23 shows the presence of bacteria(s): >100,000 CFU/mL Escherichia coli   [facility] Emergency Dept discharge antibiotic: None  Recommendations in treatment per [facility] ED lab result Urine Culture protocol.    RN Assessment (Patient s current Symptoms), include time called.  [Insert Left message here if message left]  3:36PM: Left voicemail message requesting a call back to [facility] ED Lab Result RN at [phone].  RN is available every day between 9 a.m. and 5:30 p.m.    Jeane Hernández RN  [facility] [employer] [place]  Emergency Dept Lab Result RN  Ph# [phone]     Copy of Lab result   Urine Culture  Order: [id number]   Collected 3/16/2023  7:20 PM      Status: Final result      Visible to patient: No (inaccessible in MyChart)     Specimen Information: Urine, Clean Catch    4 Result Notes  Culture >100,000 CFU/mL Escherichia coli Abnormal             Resulting Agency: [facility]     Susceptibility     Escherichia coli     ANGELIC     Ampicillin >=32 ug/mL Resistant     Ampicillin/ Sulbactam 16 ug/mL Intermediate     Cefazolin <=4 ug/mL Susceptible 1     Cefepime <=1 ug/mL Susceptible     Cefoxitin <=4 ug/mL Susceptible     Ceftazidime <=1 ug/mL Susceptible     Ceftriaxone <=1 ug/mL Susceptible     Ciprofloxacin <=0.25 ug/mL Susceptible     Gentamicin <=1 ug/mL Susceptible     Levofloxacin <=0.12 ug/mL Susceptible     Nitrofurantoin <=16 ug/mL Susceptible     Piperacillin/Tazobactam <=4 ug/mL Susceptible     Tobramycin <=1 ug/mL Susceptible     Trimethoprim/Sulfamethoxazole <=1/19 ug/mL  Susceptible              1 Cefazolin ANGELIC breakpoints are for the treatment of uncomplicated urinary tract infections. For the treatment of systemic infections, please contact the laboratory for additional testing.            Specimen Collected: 03/16/23  7:20 PM Last Resulted: 03/18/23  3:15 PM

## 2023-03-20 NOTE — TELEPHONE ENCOUNTER
"[facility] Emergency Department/Urgent Care Lab result notification     [place] ED lab result protocol used  Urine Culture     Reason for call  Notify of lab results, assess symptoms, review ED providers recommendations/discharge instructions (if necessary) and advise per ED lab result f/u protocol     Lab Result (including Rx patient on, if applicable)  Final urine culture on 3/18/23 shows the presence of bacteria(s): >100,000 CFU/mL Escherichia coli   [facility] Emergency Dept discharge antibiotic: None  Recommendations in treatment per [facility] ED lab result Urine Culture protocol.    RN Assessment (Patient s current Symptoms), include time called.  [Insert Left message here if message left]  11:35AM: Spoke with patient's daughter. She states that the patient is doing well.   Has no urinary symptoms, no frequency, urgency or pain with urination.   Denies any back pain, abdominal pain, fever, nausea, vomiting.   When asked how the dizziness was the daughter states that \"it was because she was dehydrated. I'm trying to have her drink more but she's\" stubborn.     RN Recommendations/Instructions per [place] ED lab result protocol  Patient's daughter notified of lab result and treatment recommendations.   RN reviewed information about the urine culture.   Advised per ED lab urine culture protocol to follow up with her PCP within a week to have her urine retested. The daughter agrees with this plan and plans to call the clinic today to speak with the PCP's nurse.  The patient's daughter is comfortable with the information given and has no further questions.      Please Contact your PCP clinic or return to the Emergency department if your:    Symptoms return.    Symptoms worsen or other concerning symptom's.    PCP follow-up Questions asked: YES       Jeane Hernández RN  [facility] [employer] [place]  Emergency Dept Lab Result RN  Ph# [phone]         "

## (undated) DEVICE — SU VICRYL 7-0 TG140-8DA 18" J546G

## (undated) DEVICE — SOL NACL 0.9% IRRIG 1000ML BOTTLE 2F7124

## (undated) DEVICE — LINEN TOWEL PACK X5 5464

## (undated) DEVICE — EYE PREP BETADINE 5% SOLUTION 30ML 0065-0411-30

## (undated) DEVICE — GLOVE PROTEXIS POWDER FREE SMT 7.5  2D72PT75X

## (undated) DEVICE — GLOVE PROTEXIS MICRO 6.5  2D73PM65

## (undated) DEVICE — ESU NDL COLORADO MICRO 3CM STR N103A

## (undated) DEVICE — PACK OCULOPLATIC SEN15OCFSD

## (undated) DEVICE — SOL WATER IRRIG 1000ML BOTTLE 2F7114

## (undated) DEVICE — ESU PENCIL W/SMOKE EVAC CVPLP2000

## (undated) DEVICE — SU VICRYL 5-0 S-14DA 18" J571G

## (undated) RX ORDER — ONDANSETRON 2 MG/ML
INJECTION INTRAMUSCULAR; INTRAVENOUS
Status: DISPENSED
Start: 2020-12-14

## (undated) RX ORDER — PROPOFOL 10 MG/ML
INJECTION, EMULSION INTRAVENOUS
Status: DISPENSED
Start: 2020-12-14

## (undated) RX ORDER — FENTANYL CITRATE 50 UG/ML
INJECTION, SOLUTION INTRAMUSCULAR; INTRAVENOUS
Status: DISPENSED
Start: 2020-12-14

## (undated) RX ORDER — LIDOCAINE HYDROCHLORIDE 20 MG/ML
INJECTION, SOLUTION EPIDURAL; INFILTRATION; INTRACAUDAL; PERINEURAL
Status: DISPENSED
Start: 2020-12-14

## (undated) RX ORDER — DEXAMETHASONE SODIUM PHOSPHATE 4 MG/ML
INJECTION, SOLUTION INTRA-ARTICULAR; INTRALESIONAL; INTRAMUSCULAR; INTRAVENOUS; SOFT TISSUE
Status: DISPENSED
Start: 2020-12-14